# Patient Record
Sex: FEMALE | Race: WHITE | NOT HISPANIC OR LATINO | Employment: FULL TIME | URBAN - METROPOLITAN AREA
[De-identification: names, ages, dates, MRNs, and addresses within clinical notes are randomized per-mention and may not be internally consistent; named-entity substitution may affect disease eponyms.]

---

## 2021-11-16 ENCOUNTER — LAB VISIT (OUTPATIENT)
Dept: LAB | Facility: HOSPITAL | Age: 26
End: 2021-11-16
Attending: FAMILY MEDICINE
Payer: COMMERCIAL

## 2021-11-16 ENCOUNTER — OFFICE VISIT (OUTPATIENT)
Dept: PRIMARY CARE CLINIC | Facility: CLINIC | Age: 26
End: 2021-11-16
Payer: COMMERCIAL

## 2021-11-16 VITALS
WEIGHT: 173.31 LBS | TEMPERATURE: 97 F | OXYGEN SATURATION: 98 % | HEART RATE: 82 BPM | SYSTOLIC BLOOD PRESSURE: 122 MMHG | DIASTOLIC BLOOD PRESSURE: 80 MMHG

## 2021-11-16 DIAGNOSIS — G43.809 OTHER MIGRAINE WITHOUT STATUS MIGRAINOSUS, NOT INTRACTABLE: ICD-10-CM

## 2021-11-16 DIAGNOSIS — F90.9 ATTENTION DEFICIT HYPERACTIVITY DISORDER (ADHD), UNSPECIFIED ADHD TYPE: ICD-10-CM

## 2021-11-16 DIAGNOSIS — Z00.00 ENCOUNTER FOR ANNUAL HEALTH EXAMINATION: ICD-10-CM

## 2021-11-16 DIAGNOSIS — Z00.00 ENCOUNTER FOR ANNUAL HEALTH EXAMINATION: Primary | ICD-10-CM

## 2021-11-16 DIAGNOSIS — F41.9 ANXIETY: ICD-10-CM

## 2021-11-16 LAB
ALBUMIN SERPL BCP-MCNC: 3.9 G/DL (ref 3.5–5.2)
ALP SERPL-CCNC: 31 U/L (ref 55–135)
ALT SERPL W/O P-5'-P-CCNC: 21 U/L (ref 10–44)
ANION GAP SERPL CALC-SCNC: 10 MMOL/L (ref 8–16)
AST SERPL-CCNC: 22 U/L (ref 10–40)
BASOPHILS # BLD AUTO: 0.06 K/UL (ref 0–0.2)
BASOPHILS NFR BLD: 0.9 % (ref 0–1.9)
BILIRUB SERPL-MCNC: 0.3 MG/DL (ref 0.1–1)
BUN SERPL-MCNC: 12 MG/DL (ref 6–20)
CALCIUM SERPL-MCNC: 10.1 MG/DL (ref 8.7–10.5)
CHLORIDE SERPL-SCNC: 108 MMOL/L (ref 95–110)
CHOLEST SERPL-MCNC: 165 MG/DL (ref 120–199)
CHOLEST/HDLC SERPL: 2.9 {RATIO} (ref 2–5)
CO2 SERPL-SCNC: 24 MMOL/L (ref 23–29)
CREAT SERPL-MCNC: 0.8 MG/DL (ref 0.5–1.4)
DIFFERENTIAL METHOD: ABNORMAL
EOSINOPHIL # BLD AUTO: 0.2 K/UL (ref 0–0.5)
EOSINOPHIL NFR BLD: 3 % (ref 0–8)
ERYTHROCYTE [DISTWIDTH] IN BLOOD BY AUTOMATED COUNT: 11.3 % (ref 11.5–14.5)
EST. GFR  (AFRICAN AMERICAN): >60 ML/MIN/1.73 M^2
EST. GFR  (NON AFRICAN AMERICAN): >60 ML/MIN/1.73 M^2
ESTIMATED AVG GLUCOSE: 88 MG/DL (ref 68–131)
GLUCOSE SERPL-MCNC: 77 MG/DL (ref 70–110)
HBA1C MFR BLD: 4.7 % (ref 4–5.6)
HCT VFR BLD AUTO: 40.4 % (ref 37–48.5)
HDLC SERPL-MCNC: 56 MG/DL (ref 40–75)
HDLC SERPL: 33.9 % (ref 20–50)
HGB BLD-MCNC: 14.1 G/DL (ref 12–16)
IMM GRANULOCYTES # BLD AUTO: 0.01 K/UL (ref 0–0.04)
IMM GRANULOCYTES NFR BLD AUTO: 0.2 % (ref 0–0.5)
LDLC SERPL CALC-MCNC: 83.4 MG/DL (ref 63–159)
LYMPHOCYTES # BLD AUTO: 2 K/UL (ref 1–4.8)
LYMPHOCYTES NFR BLD: 30.8 % (ref 18–48)
MCH RBC QN AUTO: 33.9 PG (ref 27–31)
MCHC RBC AUTO-ENTMCNC: 34.9 G/DL (ref 32–36)
MCV RBC AUTO: 97 FL (ref 82–98)
MONOCYTES # BLD AUTO: 0.4 K/UL (ref 0.3–1)
MONOCYTES NFR BLD: 6.7 % (ref 4–15)
NEUTROPHILS # BLD AUTO: 3.9 K/UL (ref 1.8–7.7)
NEUTROPHILS NFR BLD: 58.4 % (ref 38–73)
NONHDLC SERPL-MCNC: 109 MG/DL
NRBC BLD-RTO: 0 /100 WBC
PLATELET # BLD AUTO: 237 K/UL (ref 150–450)
PMV BLD AUTO: 12.3 FL (ref 9.2–12.9)
POTASSIUM SERPL-SCNC: 4.2 MMOL/L (ref 3.5–5.1)
PROT SERPL-MCNC: 7 G/DL (ref 6–8.4)
RBC # BLD AUTO: 4.16 M/UL (ref 4–5.4)
SODIUM SERPL-SCNC: 142 MMOL/L (ref 136–145)
TRIGL SERPL-MCNC: 128 MG/DL (ref 30–150)
TSH SERPL DL<=0.005 MIU/L-ACNC: 1.52 UIU/ML (ref 0.4–4)
WBC # BLD AUTO: 6.6 K/UL (ref 3.9–12.7)

## 2021-11-16 PROCEDURE — 80053 COMPREHEN METABOLIC PANEL: CPT | Performed by: NURSE PRACTITIONER

## 2021-11-16 PROCEDURE — 86803 HEPATITIS C AB TEST: CPT | Performed by: NURSE PRACTITIONER

## 2021-11-16 PROCEDURE — 1160F PR REVIEW ALL MEDS BY PRESCRIBER/CLIN PHARMACIST DOCUMENTED: ICD-10-PCS | Mod: CPTII,S$GLB,, | Performed by: NURSE PRACTITIONER

## 2021-11-16 PROCEDURE — 1159F PR MEDICATION LIST DOCUMENTED IN MEDICAL RECORD: ICD-10-PCS | Mod: CPTII,S$GLB,, | Performed by: NURSE PRACTITIONER

## 2021-11-16 PROCEDURE — 3079F DIAST BP 80-89 MM HG: CPT | Mod: CPTII,S$GLB,, | Performed by: NURSE PRACTITIONER

## 2021-11-16 PROCEDURE — 87389 HIV-1 AG W/HIV-1&-2 AB AG IA: CPT | Performed by: NURSE PRACTITIONER

## 2021-11-16 PROCEDURE — 99385 PR PREVENTIVE VISIT,NEW,18-39: ICD-10-PCS | Mod: S$GLB,,, | Performed by: NURSE PRACTITIONER

## 2021-11-16 PROCEDURE — 3074F PR MOST RECENT SYSTOLIC BLOOD PRESSURE < 130 MM HG: ICD-10-PCS | Mod: CPTII,S$GLB,, | Performed by: NURSE PRACTITIONER

## 2021-11-16 PROCEDURE — 84443 ASSAY THYROID STIM HORMONE: CPT | Performed by: NURSE PRACTITIONER

## 2021-11-16 PROCEDURE — 36415 COLL VENOUS BLD VENIPUNCTURE: CPT | Mod: PN | Performed by: NURSE PRACTITIONER

## 2021-11-16 PROCEDURE — 99385 PREV VISIT NEW AGE 18-39: CPT | Mod: S$GLB,,, | Performed by: NURSE PRACTITIONER

## 2021-11-16 PROCEDURE — 3079F PR MOST RECENT DIASTOLIC BLOOD PRESSURE 80-89 MM HG: ICD-10-PCS | Mod: CPTII,S$GLB,, | Performed by: NURSE PRACTITIONER

## 2021-11-16 PROCEDURE — 3074F SYST BP LT 130 MM HG: CPT | Mod: CPTII,S$GLB,, | Performed by: NURSE PRACTITIONER

## 2021-11-16 PROCEDURE — 83036 HEMOGLOBIN GLYCOSYLATED A1C: CPT | Performed by: NURSE PRACTITIONER

## 2021-11-16 PROCEDURE — 1160F RVW MEDS BY RX/DR IN RCRD: CPT | Mod: CPTII,S$GLB,, | Performed by: NURSE PRACTITIONER

## 2021-11-16 PROCEDURE — 80061 LIPID PANEL: CPT | Performed by: NURSE PRACTITIONER

## 2021-11-16 PROCEDURE — 1159F MED LIST DOCD IN RCRD: CPT | Mod: CPTII,S$GLB,, | Performed by: NURSE PRACTITIONER

## 2021-11-16 PROCEDURE — 85025 COMPLETE CBC W/AUTO DIFF WBC: CPT | Performed by: NURSE PRACTITIONER

## 2021-11-16 PROCEDURE — 99999 PR PBB SHADOW E&M-NEW PATIENT-LVL III: CPT | Mod: PBBFAC,,, | Performed by: NURSE PRACTITIONER

## 2021-11-16 PROCEDURE — 99999 PR PBB SHADOW E&M-NEW PATIENT-LVL III: ICD-10-PCS | Mod: PBBFAC,,, | Performed by: NURSE PRACTITIONER

## 2021-11-16 RX ORDER — DROSPIRENONE AND ETHINYL ESTRADIOL 0.02-3(28)
1 KIT ORAL DAILY
COMMUNITY
Start: 2021-09-06 | End: 2023-10-31 | Stop reason: ALTCHOICE

## 2021-11-16 RX ORDER — SERTRALINE HYDROCHLORIDE 50 MG/1
50 TABLET, FILM COATED ORAL DAILY
Qty: 30 TABLET | Refills: 11 | Status: SHIPPED | OUTPATIENT
Start: 2021-11-16 | End: 2023-07-25 | Stop reason: ALTCHOICE

## 2021-11-16 RX ORDER — SERTRALINE HYDROCHLORIDE 50 MG/1
TABLET, FILM COATED ORAL
COMMUNITY
End: 2021-11-16 | Stop reason: SDUPTHER

## 2021-11-16 RX ORDER — LISDEXAMFETAMINE DIMESYLATE 60 MG/1
CAPSULE ORAL
COMMUNITY
End: 2022-05-11 | Stop reason: SDUPTHER

## 2021-11-16 RX ORDER — PHENAZOPYRIDINE HYDROCHLORIDE 200 MG/1
TABLET, FILM COATED ORAL
COMMUNITY
End: 2021-11-16 | Stop reason: ALTCHOICE

## 2021-11-16 RX ORDER — AZITHROMYCIN 250 MG/1
250 TABLET, FILM COATED ORAL
COMMUNITY
Start: 2021-07-29 | End: 2021-11-16 | Stop reason: ALTCHOICE

## 2021-11-16 RX ORDER — DEXTROAMPHETAMINE SACCHARATE, AMPHETAMINE ASPARTATE, DEXTROAMPHETAMINE SULFATE AND AMPHETAMINE SULFATE 5; 5; 5; 5 MG/1; MG/1; MG/1; MG/1
1 TABLET ORAL 2 TIMES DAILY
Qty: 60 TABLET | Refills: 0 | Status: SHIPPED | OUTPATIENT
Start: 2021-11-16 | End: 2022-01-25 | Stop reason: SDUPTHER

## 2021-11-16 RX ORDER — CIPROFLOXACIN 500 MG/1
TABLET ORAL
COMMUNITY
End: 2021-11-16 | Stop reason: ALTCHOICE

## 2021-11-16 RX ORDER — KETOROLAC TROMETHAMINE 10 MG/1
TABLET, FILM COATED ORAL
COMMUNITY
End: 2022-05-11 | Stop reason: ALTCHOICE

## 2021-11-16 RX ORDER — PROMETHAZINE HYDROCHLORIDE 25 MG/1
TABLET ORAL
COMMUNITY
End: 2022-05-11 | Stop reason: ALTCHOICE

## 2021-11-16 RX ORDER — SERTRALINE HYDROCHLORIDE 100 MG/1
TABLET, FILM COATED ORAL
COMMUNITY
End: 2021-11-16 | Stop reason: ALTCHOICE

## 2021-11-16 RX ORDER — ONDANSETRON 8 MG/1
TABLET, ORALLY DISINTEGRATING ORAL
COMMUNITY
End: 2022-05-11 | Stop reason: ALTCHOICE

## 2021-11-16 RX ORDER — NAPROXEN 500 MG/1
TABLET ORAL
COMMUNITY
End: 2021-11-16 | Stop reason: ALTCHOICE

## 2021-11-16 RX ORDER — PROMETHAZINE HYDROCHLORIDE AND DEXTROMETHORPHAN HYDROBROMIDE 6.25; 15 MG/5ML; MG/5ML
SYRUP ORAL
COMMUNITY
Start: 2021-07-29 | End: 2021-11-16 | Stop reason: ALTCHOICE

## 2021-11-16 RX ORDER — CYCLOBENZAPRINE HCL 10 MG
TABLET ORAL
COMMUNITY
End: 2022-05-11 | Stop reason: ALTCHOICE

## 2021-11-17 LAB
HCV AB SERPL QL IA: NEGATIVE
HIV 1+2 AB+HIV1 P24 AG SERPL QL IA: NEGATIVE

## 2022-01-10 ENCOUNTER — PATIENT MESSAGE (OUTPATIENT)
Dept: PEDIATRICS | Facility: CLINIC | Age: 27
End: 2022-01-10
Payer: COMMERCIAL

## 2022-01-25 ENCOUNTER — PATIENT MESSAGE (OUTPATIENT)
Dept: PEDIATRICS | Facility: CLINIC | Age: 27
End: 2022-01-25
Payer: COMMERCIAL

## 2022-01-25 ENCOUNTER — OFFICE VISIT (OUTPATIENT)
Dept: PRIMARY CARE CLINIC | Facility: CLINIC | Age: 27
End: 2022-01-25
Payer: COMMERCIAL

## 2022-01-25 DIAGNOSIS — F90.9 ATTENTION DEFICIT HYPERACTIVITY DISORDER (ADHD), UNSPECIFIED ADHD TYPE: Primary | ICD-10-CM

## 2022-01-25 PROCEDURE — 1160F PR REVIEW ALL MEDS BY PRESCRIBER/CLIN PHARMACIST DOCUMENTED: ICD-10-PCS | Mod: CPTII,95,, | Performed by: NURSE PRACTITIONER

## 2022-01-25 PROCEDURE — 99212 PR OFFICE/OUTPT VISIT, EST, LEVL II, 10-19 MIN: ICD-10-PCS | Mod: 95,,, | Performed by: NURSE PRACTITIONER

## 2022-01-25 PROCEDURE — 1159F PR MEDICATION LIST DOCUMENTED IN MEDICAL RECORD: ICD-10-PCS | Mod: CPTII,95,, | Performed by: NURSE PRACTITIONER

## 2022-01-25 PROCEDURE — 1160F RVW MEDS BY RX/DR IN RCRD: CPT | Mod: CPTII,95,, | Performed by: NURSE PRACTITIONER

## 2022-01-25 PROCEDURE — 1159F MED LIST DOCD IN RCRD: CPT | Mod: CPTII,95,, | Performed by: NURSE PRACTITIONER

## 2022-01-25 PROCEDURE — 99212 OFFICE O/P EST SF 10 MIN: CPT | Mod: 95,,, | Performed by: NURSE PRACTITIONER

## 2022-01-25 RX ORDER — DEXTROAMPHETAMINE SACCHARATE, AMPHETAMINE ASPARTATE, DEXTROAMPHETAMINE SULFATE AND AMPHETAMINE SULFATE 5; 5; 5; 5 MG/1; MG/1; MG/1; MG/1
1 TABLET ORAL 2 TIMES DAILY
Qty: 60 TABLET | Refills: 0 | Status: SHIPPED | OUTPATIENT
Start: 2022-01-25 | End: 2022-05-11 | Stop reason: SDUPTHER

## 2022-01-25 NOTE — PROGRESS NOTES
Disclaimer:  This note is prepared using voice recognition software and as such is likely to have errors and has not been proof read. Please contact me for questions.    Subjective:      Patient ID: Rebeca Ortega is a 26 y.o. female.    Chief Complaint: Medication Refill and ADHD      The patient location is: work  The chief complaint leading to consultation is: ADD follow up    Visit type: audiovisual    Face to Face time with patient: 6  10 minutes of total time spent on the encounter, which includes face to face time and non-face to face time preparing to see the patient (eg, review of tests), Obtaining and/or reviewing separately obtained history, Documenting clinical information in the electronic or other health record, Independently interpreting results (not separately reported) and communicating results to the patient/family/caregiver, or Care coordination (not separately reported).         Each patient to whom he or she provides medical services by telemedicine is:  (1) informed of the relationship between the physician and patient and the respective role of any other health care provider with respect to management of the patient; and (2) notified that he or she may decline to receive medical services by telemedicine and may withdraw from such care at any time.    Ms. Ortega presents for a virtual visit today for ADD follow up. On adderall 20 mg IR and doing very well on current med and dose. Feels like symptoms are controlled. No side effects with the medication including HA, CP, palpitations, insomnia, changes in weight, etc. No other complaints at this time. See note below from previous encounter:    Has a history of ADHD, was taking Adderall 20 mg IR (was on Vyvanse prior to that but felt like she had better control of symptoms with the shorter acting and usually gets a full day out of one dose without sleep issues), but hadn't had it since around Dec 2020 as she hadn't been able to get back to [place] to see her  previous provider. Dx ADHD 2003, was seeing Mary Bueno, KOLBY @ [facility] in [place], [place] From [place]. Also hadn't been on Zoloft since around December also. Feels like she's needing to get back on her ADD and anxiety meds as shes starting a new job.      Review of Systems   Constitutional: Negative for activity change and unexpected weight change.   HENT: Negative for hearing loss, rhinorrhea and trouble swallowing.    Eyes: Negative for discharge and visual disturbance.   Respiratory: Negative for chest tightness and wheezing.    Cardiovascular: Negative for chest pain and palpitations.   Gastrointestinal: Negative for blood in stool, constipation, diarrhea and vomiting.   Endocrine: Negative for polydipsia and polyuria.   Genitourinary: Negative for difficulty urinating, dysuria, hematuria and menstrual problem.   Musculoskeletal: Negative for arthralgias, joint swelling and neck pain.   Neurological: Negative for weakness and headaches.   Psychiatric/Behavioral: Negative for confusion and dysphoric mood.       Objective:   There were no vitals taken for this visit.  Physical Exam  Constitutional:       Appearance: Normal appearance.   HENT:      Head: Normocephalic and atraumatic.      Nose: Nose normal.      Mouth/Throat:      Mouth: Mucous membranes are moist.   Eyes:      Conjunctiva/sclera: Conjunctivae normal.   Pulmonary:      Effort: Pulmonary effort is normal. No respiratory distress.   Neurological:      General: No focal deficit present.      Mental Status: She is alert and oriented to person, place, and time.       Assessment:     1. Attention deficit hyperactivity disorder (ADHD), unspecified ADHD type       Plan:   Attention deficit hyperactivity disorder (ADHD), unspecified ADHD type  -     dextroamphetamine-amphetamine (ADDERALL) 20 mg tablet; Take 1 tablet by mouth 2 (two) times daily.  Dispense: 60 tablet; Refill: 0       reviewed. Last fill was 11/16/21. Refilled meds today.  Follow up in 3  months for recheck.    Follow up in about 3 months (around 4/25/2022) for ADD recheck.    There are no Patient Instructions on file for this visit.

## 2022-04-27 ENCOUNTER — PATIENT MESSAGE (OUTPATIENT)
Dept: PRIMARY CARE CLINIC | Facility: CLINIC | Age: 27
End: 2022-04-27

## 2022-05-11 ENCOUNTER — OFFICE VISIT (OUTPATIENT)
Dept: PRIMARY CARE CLINIC | Facility: CLINIC | Age: 27
End: 2022-05-11
Payer: COMMERCIAL

## 2022-05-11 VITALS
WEIGHT: 178.44 LBS | HEART RATE: 65 BPM | SYSTOLIC BLOOD PRESSURE: 116 MMHG | TEMPERATURE: 98 F | DIASTOLIC BLOOD PRESSURE: 80 MMHG

## 2022-05-11 DIAGNOSIS — Z76.0 MEDICATION REFILL: ICD-10-CM

## 2022-05-11 DIAGNOSIS — F90.9 ATTENTION DEFICIT HYPERACTIVITY DISORDER (ADHD), UNSPECIFIED ADHD TYPE: Primary | ICD-10-CM

## 2022-05-11 DIAGNOSIS — Z12.4 CERVICAL CANCER SCREENING: ICD-10-CM

## 2022-05-11 PROCEDURE — 99999 PR PBB SHADOW E&M-EST. PATIENT-LVL III: ICD-10-PCS | Mod: PBBFAC,,, | Performed by: NURSE PRACTITIONER

## 2022-05-11 PROCEDURE — 99999 PR PBB SHADOW E&M-EST. PATIENT-LVL III: CPT | Mod: PBBFAC,,, | Performed by: NURSE PRACTITIONER

## 2022-05-11 PROCEDURE — 99213 OFFICE O/P EST LOW 20 MIN: CPT | Mod: S$GLB,,, | Performed by: NURSE PRACTITIONER

## 2022-05-11 PROCEDURE — 88175 CYTOPATH C/V AUTO FLUID REDO: CPT | Performed by: NURSE PRACTITIONER

## 2022-05-11 PROCEDURE — 99213 PR OFFICE/OUTPT VISIT, EST, LEVL III, 20-29 MIN: ICD-10-PCS | Mod: S$GLB,,, | Performed by: NURSE PRACTITIONER

## 2022-05-11 RX ORDER — DEXTROAMPHETAMINE SACCHARATE, AMPHETAMINE ASPARTATE, DEXTROAMPHETAMINE SULFATE AND AMPHETAMINE SULFATE 5; 5; 5; 5 MG/1; MG/1; MG/1; MG/1
1 TABLET ORAL 2 TIMES DAILY
Qty: 60 TABLET | Refills: 0 | Status: SHIPPED | OUTPATIENT
Start: 2022-05-11 | End: 2022-08-26 | Stop reason: ALTCHOICE

## 2022-05-11 RX ORDER — LISDEXAMFETAMINE DIMESYLATE 60 MG/1
60 CAPSULE ORAL EVERY MORNING
Qty: 30 CAPSULE | Refills: 0 | Status: SHIPPED | OUTPATIENT
Start: 2022-05-11 | End: 2022-05-11 | Stop reason: ALTCHOICE

## 2022-05-11 NOTE — PROGRESS NOTES
Disclaimer:  This note is prepared using voice recognition software and as such is likely to have errors and has not been proof read. Please contact me for questions.    Subjective:      Patient ID: Rebeca Ortega is a 26 y.o. female.    Chief Complaint: Medication Refill and Gynecologic Exam (last pap 3 years ago)    Here for pap and medication refill. Last pap was about 3 years ago. No history of abnormal paps in the past. On Carmelina for contraception. Doing well overall. 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 Likes it a lot. Getting used to nights. Due for med refill on ADD meds (Adderall 20 mg BID). No side effects with medications including chest pain, HA, insomnia, appetite suppression, palpitations, etc. Also doing well on sertraline.            Review of Systems   Constitutional: Negative for activity change and unexpected weight change.   HENT: Negative for hearing loss, rhinorrhea and trouble swallowing.    Eyes: Negative for discharge and visual disturbance.   Respiratory: Negative for chest tightness and wheezing.    Cardiovascular: Negative for chest pain and palpitations.   Gastrointestinal: Negative for blood in stool, constipation, diarrhea and vomiting.   Endocrine: Negative for polydipsia and polyuria.   Genitourinary: Negative for difficulty urinating, dysuria, hematuria and menstrual problem.   Musculoskeletal: Negative for arthralgias, joint swelling and neck pain.   Neurological: Negative for weakness and headaches.   Psychiatric/Behavioral: Negative for confusion and dysphoric mood.       Objective:   /80   Pulse 65   Temp 98 °F (36.7 °C)   Wt 81 kg (178 lb 7.4 oz)   LMP 04/27/2022   Physical Exam  Exam conducted with a chaperone present.   Constitutional:       General: She is not in acute distress.     Appearance: Normal appearance. She is well-developed.   HENT:      Head: Normocephalic and atraumatic.      Right Ear: Hearing, tympanic membrane,  ear canal and external ear normal.      Left Ear: Hearing, tympanic membrane, ear canal and external ear normal.      Nose: Nose normal.      Mouth/Throat:      Mouth: Mucous membranes are moist.      Pharynx: Oropharynx is clear.   Eyes:      General: No scleral icterus.     Extraocular Movements: Extraocular movements intact.      Conjunctiva/sclera: Conjunctivae normal.   Neck:      Thyroid: No thyroid mass, thyromegaly or thyroid tenderness.   Cardiovascular:      Rate and Rhythm: Normal rate and regular rhythm.      Heart sounds: Normal heart sounds. No murmur heard.    No friction rub. No gallop.   Pulmonary:      Effort: Pulmonary effort is normal.      Breath sounds: Normal breath sounds.   Abdominal:      General: Bowel sounds are normal. There is no distension.      Palpations: Abdomen is soft.      Tenderness: There is no abdominal tenderness.      Hernia: There is no hernia in the left inguinal area or right inguinal area.   Genitourinary:     General: Normal vulva.      Exam position: Lithotomy position.      Labia:         Right: No rash, tenderness or lesion.         Left: No rash, tenderness or lesion.       Vagina: Normal. No vaginal discharge.      Cervix: Normal.      Uterus: Normal.       Adnexa: Right adnexa normal and left adnexa normal.   Musculoskeletal:         General: Normal range of motion.      Cervical back: Full passive range of motion without pain, normal range of motion and neck supple.      Right lower leg: No edema.      Left lower leg: No edema.   Lymphadenopathy:      Cervical: No cervical adenopathy.      Lower Body: No right inguinal adenopathy. No left inguinal adenopathy.   Skin:     General: Skin is warm and dry.      Capillary Refill: Capillary refill takes less than 2 seconds.      Findings: No rash.   Neurological:      General: No focal deficit present.      Mental Status: She is alert and oriented to person, place, and time.   Psychiatric:         Mood and Affect: Mood  normal.         Behavior: Behavior normal.         Thought Content: Thought content normal.         Judgment: Judgment normal.       Assessment:     1. Attention deficit hyperactivity disorder (ADHD), unspecified ADHD type    2. Medication refill    3. Cervical cancer screening       Plan:   Attention deficit hyperactivity disorder (ADHD), unspecified ADHD type  -     dextroamphetamine-amphetamine (ADDERALL) 20 mg tablet; Take 1 tablet by mouth 2 (two) times daily.  Dispense: 60 tablet; Refill: 0  -     Discontinue: lisdexamfetamine (VYVANSE) 60 MG capsule; Take 1 capsule (60 mg total) by mouth every morning.  Dispense: 30 capsule; Refill: 0    Medication refill    Cervical cancer screening  -     Liquid-Based Pap Smear, Screening    Pap completed today.   reviewed. Adderall refilled.   Follow up in 3 months for medication refill/ADD. Can schedule next visit with Dr. Ortez.    No follow-ups on file.    There are no Patient Instructions on file for this visit.

## 2022-08-26 ENCOUNTER — OFFICE VISIT (OUTPATIENT)
Dept: PRIMARY CARE CLINIC | Facility: CLINIC | Age: 27
End: 2022-08-26
Payer: COMMERCIAL

## 2022-08-26 VITALS
BODY MASS INDEX: 25.37 KG/M2 | WEIGHT: 181.19 LBS | HEART RATE: 65 BPM | DIASTOLIC BLOOD PRESSURE: 78 MMHG | HEIGHT: 71 IN | TEMPERATURE: 98 F | SYSTOLIC BLOOD PRESSURE: 112 MMHG | OXYGEN SATURATION: 98 %

## 2022-08-26 DIAGNOSIS — F41.9 ANXIETY: ICD-10-CM

## 2022-08-26 DIAGNOSIS — F90.9 ATTENTION DEFICIT HYPERACTIVITY DISORDER (ADHD), UNSPECIFIED ADHD TYPE: Primary | ICD-10-CM

## 2022-08-26 PROCEDURE — 99999 PR PBB SHADOW E&M-EST. PATIENT-LVL III: CPT | Mod: PBBFAC,,, | Performed by: NURSE PRACTITIONER

## 2022-08-26 PROCEDURE — 99213 PR OFFICE/OUTPT VISIT, EST, LEVL III, 20-29 MIN: ICD-10-PCS | Mod: S$GLB,,, | Performed by: NURSE PRACTITIONER

## 2022-08-26 PROCEDURE — 99213 OFFICE O/P EST LOW 20 MIN: CPT | Mod: S$GLB,,, | Performed by: NURSE PRACTITIONER

## 2022-08-26 PROCEDURE — 99999 PR PBB SHADOW E&M-EST. PATIENT-LVL III: ICD-10-PCS | Mod: PBBFAC,,, | Performed by: NURSE PRACTITIONER

## 2022-08-26 RX ORDER — CLONIDINE HYDROCHLORIDE 0.1 MG/1
0.1 TABLET ORAL NIGHTLY PRN
Qty: 60 TABLET | Refills: 11 | Status: SHIPPED | OUTPATIENT
Start: 2022-08-26 | End: 2022-10-26 | Stop reason: ALTCHOICE

## 2022-08-26 RX ORDER — LISDEXAMFETAMINE DIMESYLATE 40 MG/1
40 CAPSULE ORAL DAILY
Qty: 30 CAPSULE | Refills: 0 | Status: SHIPPED | OUTPATIENT
Start: 2022-08-26 | End: 2022-10-17 | Stop reason: SDUPTHER

## 2022-08-26 RX ORDER — DEXTROAMPHETAMINE SACCHARATE, AMPHETAMINE ASPARTATE, DEXTROAMPHETAMINE SULFATE AND AMPHETAMINE SULFATE 2.5; 2.5; 2.5; 2.5 MG/1; MG/1; MG/1; MG/1
1 TABLET ORAL
Qty: 30 TABLET | Refills: 0 | Status: SHIPPED | OUTPATIENT
Start: 2022-08-26 | End: 2022-10-17 | Stop reason: SDUPTHER

## 2022-08-26 NOTE — PROGRESS NOTES
"Disclaimer:  This note is prepared using voice recognition software and as such is likely to have errors and has not been proof read. Please contact me for questions.    Subjective:      Patient ID: Rebeca Ortega is a 26 y.o. female.    Chief Complaint: Follow-up (ADD, wants to adjust meds )    Ms. Ortega presents to clinic today for ADD follow up. Wanting to see about switching back to a longer acting medication for her ADD, but felt like the Vyvanse 60 mg was too strong. Works longer shifts so also concerned that lowering dose might not last as long. Having some difficulty at night getting to sleep. Brain won't turn off. Not sure if sertraline is helping any more.       Review of Systems   Constitutional: Negative for chills and fever.   HENT: Negative.    Eyes: Negative.    Respiratory: Negative.  Negative for shortness of breath and wheezing.    Cardiovascular: Negative.    Gastrointestinal: Negative for abdominal pain, constipation, diarrhea, nausea and vomiting.   Endocrine: Negative.    Genitourinary: Negative.    Musculoskeletal: Negative.  Negative for neck pain and neck stiffness.   Skin: Negative for rash.   Allergic/Immunologic: Negative.    Neurological: Negative.    Hematological: Negative.    Psychiatric/Behavioral: Positive for decreased concentration and sleep disturbance. Negative for self-injury and suicidal ideas.   All other systems reviewed and are negative.      Objective:   /78   Pulse 65   Temp 97.6 °F (36.4 °C) (Temporal)   Ht 5' 11" (1.803 m)   Wt 82.2 kg (181 lb 3.5 oz)   LMP 08/12/2022   SpO2 98%   BMI 25.27 kg/m²   Physical Exam  Vitals and nursing note reviewed.   Constitutional:       General: She is not in acute distress.     Appearance: Normal appearance. She is well-developed. She is not diaphoretic.   HENT:      Head: Normocephalic and atraumatic.      Right Ear: External ear normal.      Left Ear: External ear normal.      Nose: Nose normal.   Eyes:      General:    "      Right eye: No discharge.         Left eye: No discharge.   Cardiovascular:      Rate and Rhythm: Normal rate.   Pulmonary:      Effort: Pulmonary effort is normal. No respiratory distress.   Musculoskeletal:         General: Normal range of motion.      Cervical back: Normal range of motion and neck supple.   Skin:     General: Skin is warm and dry.      Findings: No rash.   Neurological:      Mental Status: She is alert and oriented to person, place, and time.   Psychiatric:         Behavior: Behavior normal.         Thought Content: Thought content normal.         Judgment: Judgment normal.       Assessment:     1. Attention deficit hyperactivity disorder (ADHD), unspecified ADHD type    2. Anxiety       Plan:   Attention deficit hyperactivity disorder (ADHD), unspecified ADHD type  -     lisdexamfetamine (VYVANSE) 40 MG Cap; Take 1 capsule (40 mg total) by mouth once daily.  Dispense: 30 capsule; Refill: 0  -     dextroamphetamine-amphetamine 10 mg Tab; Take 1 tablet (10 mg total) by mouth daily with lunch.  Dispense: 30 tablet; Refill: 0  -     cloNIDine (CATAPRES) 0.1 MG tablet; Take 1 tablet (0.1 mg total) by mouth nightly as needed (stimulant-related insomnia).  Dispense: 60 tablet; Refill: 11    Anxiety    Try switching from Adderall 20 mg IR BID to Vyvanse 40 mg. If not lasting through full work shift can supplement with 5-10 mg Adderall IR half way through shift. Clonidine PRN stimulant-induced insomnia.   Will hold off on switching around the sertraline for now due to other changes. Follow up in one month to reassess.   reviewed.        02/20/2022 01/25/2022   2  Dextroamp-Amphetamin 20 Mg Tab   60.00  30  Em Kin  [id number]  Alb (2621)  0   [place]     11/16/2021 11/16/2021   2  Dextroamp-Amphetamin 20 Mg Tab   60.00  30  Em Kin  [id number]  Alb (2621)  0   [place]     03/02/2021 03/01/2021   1  Aluhre-Wvnbrbpi-Tgts -40   20.00  5  Em Kin  [id number]  Asha ([id number])  0  2.00 LME  [place]          No follow-ups on file.    There are no Patient Instructions on file for this visit.

## 2022-08-29 ENCOUNTER — TELEPHONE (OUTPATIENT)
Dept: PRIMARY CARE CLINIC | Facility: CLINIC | Age: 27
End: 2022-08-29
Payer: COMMERCIAL

## 2022-08-29 NOTE — TELEPHONE ENCOUNTER
----- Message from Suzie Flowers sent at 8/26/2022 12:55 PM CDT -----  Type:  Pharmacy Calling to Clarify an RX    Name of Caller:Robin  Pharm[facility] Name[facility]  Prescription Name:Caitie sent over today  What do they need to clarify?:pt have no ID  Best Call Back Number: [phone]  Additional Information: pt have no ID, pt have different address, need to know if Evelin know pt

## 2022-10-17 ENCOUNTER — OFFICE VISIT (OUTPATIENT)
Dept: PRIMARY CARE CLINIC | Facility: CLINIC | Age: 27
End: 2022-10-17
Payer: COMMERCIAL

## 2022-10-17 DIAGNOSIS — F19.982 DRUG-INDUCED INSOMNIA: ICD-10-CM

## 2022-10-17 DIAGNOSIS — F90.8 ATTENTION DEFICIT HYPERACTIVITY DISORDER (ADHD), OTHER TYPE: Primary | ICD-10-CM

## 2022-10-17 DIAGNOSIS — F90.9 ATTENTION DEFICIT HYPERACTIVITY DISORDER (ADHD), UNSPECIFIED ADHD TYPE: ICD-10-CM

## 2022-10-17 PROCEDURE — 99214 OFFICE O/P EST MOD 30 MIN: CPT | Mod: 95,,, | Performed by: PHYSICIAN ASSISTANT

## 2022-10-17 PROCEDURE — 99214 PR OFFICE/OUTPT VISIT, EST, LEVL IV, 30-39 MIN: ICD-10-PCS | Mod: 95,,, | Performed by: PHYSICIAN ASSISTANT

## 2022-10-17 NOTE — PROGRESS NOTES
Subjective:      Patient ID: Rebeca Ortega is a 26 y.o. female.    Chief Complaint: ADD    The patient location is: home   The chief complaint leading to consultation is:  ADD     Visit type: audiovisual    Face to Face time with patient: 11 minutes   16 minutes of total time spent on the encounter, which includes face to face time and non-face to face time preparing to see the patient (eg, review of tests), Obtaining and/or reviewing separately obtained history, Documenting clinical information in the electronic or other health record, Independently interpreting results (not separately reported) and communicating results to the patient/family/caregiver, or Care coordination (not separately reported).         Each patient to whom he or she provides medical services by telemedicine is:  (1) informed of the relationship between the physician and patient and the respective role of any other health care provider with respect to management of the patient; and (2) notified that he or she may decline to receive medical services by telemedicine and may withdraw from such care at any time.    Notes:     Rebeca Ortega is a 26 y.o.female who presents to video visit for ADD follow-up.  Has seen Evelin Hill NP in past for ADD   At last visit with Mrs. Hill NP, changed to Vyvanse 40 mg with Adderall 10 mg IR and it is really helping. Use adderall very sparingly - only times had to use it was when work overtime on shift. Doesn't affect mood as much either. Not as irritable on Vyvanse.    Clonidine is helping to sleep   No cp/palpitations         Review of Systems   Constitutional:  Negative for activity change and unexpected weight change.   HENT:  Negative for hearing loss, rhinorrhea and trouble swallowing.    Eyes:  Negative for discharge and visual disturbance.   Respiratory:  Negative for chest tightness and wheezing.    Cardiovascular:  Negative for chest pain and palpitations.   Gastrointestinal:  Negative for  blood in stool, constipation, diarrhea and vomiting.   Endocrine: Negative for polydipsia and polyuria.   Genitourinary:  Negative for difficulty urinating, dysuria, hematuria and menstrual problem.   Musculoskeletal:  Negative for arthralgias, joint swelling and neck pain.   Neurological:  Negative for weakness and headaches.   Psychiatric/Behavioral:  Negative for confusion and dysphoric mood.      Objective:   There were no vitals taken for this visit.  Physical Exam  Constitutional:       General: She is not in acute distress.     Appearance: She is well-developed. She is not diaphoretic.   HENT:      Head: Normocephalic and atraumatic.      Right Ear: External ear normal.      Left Ear: External ear normal.      Nose: Nose normal. No rhinorrhea.   Pulmonary:      Effort: Pulmonary effort is normal. No respiratory distress.   Neurological:      Mental Status: She is alert and oriented to person, place, and time.      Motor: No abnormal muscle tone.   Psychiatric:         Mood and Affect: Mood normal.         Behavior: Behavior normal.         Thought Content: Thought content normal.     Assessment:      1. Attention deficit hyperactivity disorder (ADHD), other type    2. Drug-induced insomnia       Plan:   Attention deficit hyperactivity disorder (ADHD), other type  Comments:  chronic, improved with Vyvanse 40 mg and sparing use of Adderall IR 10 mg.  willing to send in BP, denies cp/palpitations     Drug-induced insomnia  Comments:  clonidine helping with insomnia - sleeping better with med     Follow-up in 3 mths with Dr. Ortez for ADD     Evelin,    Saw Rebeca today for ADD refill.  Vyvanse 40 mg and Adderall IR 10 mg working beautifully.  Clonidine also helped her with sleep.   reviewed, last fill in late August.  Would you mind refilling med since patient has not yet seen Dr. Ortez - will get next follow-up with Dr. Ortez and will be sending in BP reading.    Kayley Correa PA-C   Physician Assistant    [facility]      08/26/2022 08/26/2022   1  Vyvanse 40 Mg Capsule 30.00  30  Em Kin  [id number]   Alb (2621)  0   [place]     08/26/2022 08/26/2022   1  Dextroamp-Amphetamin 10 Mg Tab 30.00  30  Em Kin  [id number]   Alb (2621)  0   [place]     02/20/2022 01/25/2022   2  Dextroamp-Amphetamin 20 Mg Tab 60.00  30  Em Kin  [id number]   Alb (2621)  0   [place]     11/16/2021 11/16/2021   2  Dextroamp-Amphetamin 20 Mg Tab 60.00  30  Em Kin  [id number]   Alb (2621)  0   [place]     03/02/2021 03/01/2021   1  Ztxdvk-Qqqutnvj-Rcbu -40 20.00  5  Em Kin  [id number]   Asha ([id number])  0  2.00 LME  [place]     01/29/2021 01/29/2021   1  Dextroamp-Amphetamin 20 Mg Tab 60.00  30  [facility]  [id number]   Kro ([id number])  0   [place]     12/09/2020 12/09/2020   1  Dextroamp-Amphetamin 20 Mg Tab 60.00  30  Kr Tri  [id number]   Asha (2490)  0   [place]      Kayley Correa PA-C   Physician Assistant   [facility]

## 2022-10-18 RX ORDER — DEXTROAMPHETAMINE SACCHARATE, AMPHETAMINE ASPARTATE, DEXTROAMPHETAMINE SULFATE AND AMPHETAMINE SULFATE 2.5; 2.5; 2.5; 2.5 MG/1; MG/1; MG/1; MG/1
1 TABLET ORAL
Qty: 30 TABLET | Refills: 0 | Status: SHIPPED | OUTPATIENT
Start: 2022-10-18 | End: 2022-12-22 | Stop reason: SDUPTHER

## 2022-10-18 RX ORDER — LISDEXAMFETAMINE DIMESYLATE 40 MG/1
40 CAPSULE ORAL DAILY
Qty: 30 CAPSULE | Refills: 0 | Status: SHIPPED | OUTPATIENT
Start: 2022-10-18 | End: 2022-10-26 | Stop reason: ALTCHOICE

## 2022-10-26 ENCOUNTER — OFFICE VISIT (OUTPATIENT)
Dept: PRIMARY CARE CLINIC | Facility: CLINIC | Age: 27
End: 2022-10-26
Payer: COMMERCIAL

## 2022-10-26 VITALS
TEMPERATURE: 98 F | HEIGHT: 71 IN | DIASTOLIC BLOOD PRESSURE: 78 MMHG | WEIGHT: 172.75 LBS | BODY MASS INDEX: 24.19 KG/M2 | HEART RATE: 73 BPM | SYSTOLIC BLOOD PRESSURE: 125 MMHG

## 2022-10-26 DIAGNOSIS — F90.9 ATTENTION DEFICIT HYPERACTIVITY DISORDER (ADHD), UNSPECIFIED ADHD TYPE: Primary | ICD-10-CM

## 2022-10-26 DIAGNOSIS — G43.809 OTHER MIGRAINE WITHOUT STATUS MIGRAINOSUS, NOT INTRACTABLE: ICD-10-CM

## 2022-10-26 DIAGNOSIS — R11.0 NAUSEA: ICD-10-CM

## 2022-10-26 PROCEDURE — 99999 PR PBB SHADOW E&M-EST. PATIENT-LVL IV: ICD-10-PCS | Mod: PBBFAC,,, | Performed by: NURSE PRACTITIONER

## 2022-10-26 PROCEDURE — 99214 PR OFFICE/OUTPT VISIT, EST, LEVL IV, 30-39 MIN: ICD-10-PCS | Mod: S$GLB,,, | Performed by: NURSE PRACTITIONER

## 2022-10-26 PROCEDURE — 99999 PR PBB SHADOW E&M-EST. PATIENT-LVL IV: CPT | Mod: PBBFAC,,, | Performed by: NURSE PRACTITIONER

## 2022-10-26 PROCEDURE — 99214 OFFICE O/P EST MOD 30 MIN: CPT | Mod: S$GLB,,, | Performed by: NURSE PRACTITIONER

## 2022-10-26 RX ORDER — PROPRANOLOL HYDROCHLORIDE 20 MG/1
20 TABLET ORAL 2 TIMES DAILY
Qty: 60 TABLET | Refills: 11 | Status: SHIPPED | OUTPATIENT
Start: 2022-10-26 | End: 2023-05-17

## 2022-10-26 RX ORDER — ONDANSETRON 4 MG/1
4 TABLET, ORALLY DISINTEGRATING ORAL EVERY 8 HOURS PRN
Qty: 30 TABLET | Refills: 0 | Status: SHIPPED | OUTPATIENT
Start: 2022-10-26 | End: 2023-02-28

## 2022-10-26 RX ORDER — METHYLPHENIDATE HYDROCHLORIDE 36 MG/1
36 TABLET ORAL EVERY MORNING
Qty: 30 TABLET | Refills: 0 | Status: SHIPPED | OUTPATIENT
Start: 2022-10-26 | End: 2022-12-22 | Stop reason: SDUPTHER

## 2022-10-26 NOTE — PROGRESS NOTES
"Disclaimer:  This note is prepared using voice recognition software and as such is likely to have errors and has not been proof read. Please contact me for questions.    Subjective:      Patient ID: Rebeca Ortega is a 26 y.o. female.    Chief Complaint: Follow-up    Medication follow up. Doing well on current regimen. No reported side effects. Having some headaches and nausea intermittently. Has a history of migraines.    Follow-up  Associated symptoms include headaches and nausea (with HAs). Pertinent negatives include no abdominal pain, chills, fever, neck pain, rash or vomiting.       Review of Systems   Constitutional:  Negative for chills and fever.   HENT: Negative.     Eyes: Negative.    Respiratory: Negative.  Negative for shortness of breath and wheezing.    Cardiovascular: Negative.    Gastrointestinal:  Positive for nausea (with HAs). Negative for abdominal pain, constipation, diarrhea and vomiting.   Endocrine: Negative.    Genitourinary: Negative.    Musculoskeletal: Negative.  Negative for neck pain and neck stiffness.   Skin:  Negative for rash.   Allergic/Immunologic: Negative.    Neurological:  Positive for headaches.   Hematological: Negative.    Psychiatric/Behavioral: Negative.     All other systems reviewed and are negative.    Objective:   /78   Pulse 73   Temp 97.7 °F (36.5 °C)   Ht 5' 11" (1.803 m)   Wt 78.4 kg (172 lb 11.7 oz)   LMP 10/26/2022 (Approximate)   BMI 24.09 kg/m²   Physical Exam  Constitutional:       General: She is not in acute distress.     Appearance: Normal appearance. She is well-developed.   HENT:      Head: Normocephalic and atraumatic.      Right Ear: Hearing, tympanic membrane, ear canal and external ear normal.      Left Ear: Hearing, tympanic membrane, ear canal and external ear normal.      Nose: Nose normal.      Mouth/Throat:      Mouth: Mucous membranes are moist.      Pharynx: Oropharynx is clear.   Eyes:      General: No scleral icterus.     " Extraocular Movements: Extraocular movements intact.      Conjunctiva/sclera: Conjunctivae normal.   Neck:      Thyroid: No thyroid mass, thyromegaly or thyroid tenderness.   Cardiovascular:      Rate and Rhythm: Normal rate and regular rhythm.      Heart sounds: Normal heart sounds. No murmur heard.    No friction rub. No gallop.   Pulmonary:      Effort: Pulmonary effort is normal.      Breath sounds: Normal breath sounds.   Abdominal:      General: Bowel sounds are normal. There is no distension.      Palpations: Abdomen is soft.      Tenderness: There is no abdominal tenderness.   Musculoskeletal:         General: Normal range of motion.      Cervical back: Full passive range of motion without pain, normal range of motion and neck supple.      Right lower leg: No edema.      Left lower leg: No edema.   Lymphadenopathy:      Cervical: No cervical adenopathy.   Skin:     General: Skin is warm and dry.      Capillary Refill: Capillary refill takes less than 2 seconds.      Findings: No rash.   Neurological:      General: No focal deficit present.      Mental Status: She is alert and oriented to person, place, and time.   Psychiatric:         Mood and Affect: Mood normal.         Behavior: Behavior normal.         Thought Content: Thought content normal.         Judgment: Judgment normal.     Assessment:     1. Attention deficit hyperactivity disorder (ADHD), unspecified ADHD type    2. Other migraine without status migrainosus, not intractable    3. Nausea       Plan:   Attention deficit hyperactivity disorder (ADHD), unspecified ADHD type  -     Discontinue: methylphenidate HCl 36 MG CR tablet; Take 1 tablet (36 mg total) by mouth every morning.  Dispense: 30 tablet; Refill: 0    Other migraine without status migrainosus, not intractable  -     propranoloL (INDERAL) 20 MG tablet; Take 1 tablet (20 mg total) by mouth 2 (two) times daily.  Dispense: 60 tablet; Refill: 11  -     Ambulatory referral/consult to  Neurology; Future; Expected date: 11/02/2022    Nausea  -     ondansetron (ZOFRAN-ODT) 4 MG TbDL; Take 1 tablet (4 mg total) by mouth every 8 (eight) hours as needed (nausea).  Dispense: 30 tablet; Refill: 0        Vyvanse no longer covered.    No follow-ups on file.    There are no Patient Instructions on file for this visit.

## 2022-10-26 NOTE — LETTER
October 26, 2022      [facility]  [address]  [place] [address]  Phone: [phone]  Fax: [phone]       Patient: Rebeca Ortega   YOB: 1995  Date of Visit: 10/26/2022    To Whom It May Concern:    Yang Ortega was seen at [facility] on 10/26/2022. The patient may return to work/school on 10/27/22 with no restrictions. If you have any questions or concerns, or if I can be of further assistance, please do not hesitate to contact me.    Sincerely,    Evelin Hill, NP

## 2022-12-22 ENCOUNTER — OFFICE VISIT (OUTPATIENT)
Dept: PRIMARY CARE CLINIC | Facility: CLINIC | Age: 27
End: 2022-12-22
Payer: COMMERCIAL

## 2022-12-22 ENCOUNTER — APPOINTMENT (OUTPATIENT)
Dept: RADIOLOGY | Facility: HOSPITAL | Age: 27
End: 2022-12-22
Attending: PHYSICIAN ASSISTANT
Payer: COMMERCIAL

## 2022-12-22 VITALS
BODY MASS INDEX: 25.62 KG/M2 | SYSTOLIC BLOOD PRESSURE: 118 MMHG | HEIGHT: 71 IN | WEIGHT: 183 LBS | TEMPERATURE: 99 F | DIASTOLIC BLOOD PRESSURE: 80 MMHG | HEART RATE: 71 BPM

## 2022-12-22 DIAGNOSIS — M79.89 SOFT TISSUE MASS: ICD-10-CM

## 2022-12-22 DIAGNOSIS — F90.9 ATTENTION DEFICIT HYPERACTIVITY DISORDER (ADHD), UNSPECIFIED ADHD TYPE: ICD-10-CM

## 2022-12-22 DIAGNOSIS — F90.9 ATTENTION DEFICIT HYPERACTIVITY DISORDER (ADHD), UNSPECIFIED ADHD TYPE: Primary | ICD-10-CM

## 2022-12-22 PROCEDURE — 76882 US SOFT TISSUE, UPPER EXTREMITY, LEFT: ICD-10-PCS | Mod: 26,LT,, | Performed by: RADIOLOGY

## 2022-12-22 PROCEDURE — 99214 PR OFFICE/OUTPT VISIT, EST, LEVL IV, 30-39 MIN: ICD-10-PCS | Mod: S$GLB,,, | Performed by: PHYSICIAN ASSISTANT

## 2022-12-22 PROCEDURE — 99999 PR PBB SHADOW E&M-EST. PATIENT-LVL III: ICD-10-PCS | Mod: PBBFAC,,, | Performed by: PHYSICIAN ASSISTANT

## 2022-12-22 PROCEDURE — 76882 US LMTD JT/FCL EVL NVASC XTR: CPT | Mod: TC,PO,LT

## 2022-12-22 PROCEDURE — 99214 OFFICE O/P EST MOD 30 MIN: CPT | Mod: S$GLB,,, | Performed by: PHYSICIAN ASSISTANT

## 2022-12-22 PROCEDURE — 76882 US LMTD JT/FCL EVL NVASC XTR: CPT | Mod: 26,LT,, | Performed by: RADIOLOGY

## 2022-12-22 PROCEDURE — 99999 PR PBB SHADOW E&M-EST. PATIENT-LVL III: CPT | Mod: PBBFAC,,, | Performed by: PHYSICIAN ASSISTANT

## 2022-12-22 RX ORDER — DEXTROAMPHETAMINE SACCHARATE, AMPHETAMINE ASPARTATE, DEXTROAMPHETAMINE SULFATE AND AMPHETAMINE SULFATE 2.5; 2.5; 2.5; 2.5 MG/1; MG/1; MG/1; MG/1
1 TABLET ORAL
Qty: 30 TABLET | Refills: 0 | Status: SHIPPED | OUTPATIENT
Start: 2022-12-22 | End: 2023-03-21 | Stop reason: SDUPTHER

## 2022-12-22 RX ORDER — METHYLPHENIDATE HYDROCHLORIDE 36 MG/1
36 TABLET ORAL EVERY MORNING
Qty: 30 TABLET | Refills: 0 | Status: SHIPPED | OUTPATIENT
Start: 2022-12-22 | End: 2023-02-16 | Stop reason: SDUPTHER

## 2022-12-22 NOTE — PROGRESS NOTES
"Subjective:      Patient ID: Rebeca Ortega is a 26 y.o. female.    Chief Complaint: Medication Refill    Rebeca Ortega is a 26 y.o. female who presents to clinic for ADD follow-up       ADD - on concerta 36 as well as adderall 10 mg around lunch as needed - only take meds when work.  No bp, appetite, sleep, anxiety, palpitations on med   On zoloft - doing great   Have felt lump in upper left arm that wanted to get checked out   Declines flu vaccine       Review of Systems   Constitutional:  Negative for activity change, appetite change, fatigue, fever and unexpected weight change.   HENT:  Negative for congestion, ear pain, sore throat and trouble swallowing.    Respiratory:  Negative for cough and shortness of breath.    Cardiovascular:  Negative for chest pain and palpitations.   Gastrointestinal:  Negative for abdominal distention, abdominal pain, constipation, diarrhea, nausea and vomiting.   Genitourinary:  Negative for difficulty urinating, frequency and urgency.   Musculoskeletal:  Negative for arthralgias and myalgias.   Skin:         Lump in left arm    Neurological:  Negative for dizziness, weakness and light-headedness.   Psychiatric/Behavioral:  Negative for decreased concentration and dysphoric mood. The patient is not nervous/anxious.      Objective:   /80   Pulse 71   Temp 98.9 °F (37.2 °C)   Ht 5' 11" (1.803 m)   Wt 83 kg (182 lb 15.7 oz)   BMI 25.52 kg/m²   Physical Exam  Vitals reviewed.   Constitutional:       General: She is not in acute distress.     Appearance: She is well-developed. She is not ill-appearing, toxic-appearing or diaphoretic.   HENT:      Head: Normocephalic and atraumatic.      Right Ear: External ear normal.      Left Ear: External ear normal.      Nose: Nose normal.   Cardiovascular:      Rate and Rhythm: Normal rate and regular rhythm.      Pulses:           Radial pulses are 2+ on the right side and 2+ on the left side.      Heart sounds: Normal heart sounds, " S1 normal and S2 normal.   Pulmonary:      Effort: Pulmonary effort is normal. No tachypnea, bradypnea, accessory muscle usage or respiratory distress.      Breath sounds: Normal breath sounds. No decreased breath sounds, wheezing, rhonchi or rales.   Chest:      Chest wall: No tenderness.   Skin:     General: Skin is warm and dry.      Capillary Refill: Capillary refill takes less than 2 seconds.      Comments: Soft, nontender, semimobile soft tissue mass in distal left upper arm near elbow    Neurological:      Mental Status: She is alert and oriented to person, place, and time. She is not disoriented.      Cranial Nerves: No cranial nerve deficit.      Sensory: No sensory deficit.      Motor: No abnormal muscle tone.   Psychiatric:         Behavior: Behavior normal.     Assessment:      1. Attention deficit hyperactivity disorder (ADHD), unspecified ADHD type    2. Soft tissue mass       Plan:   Attention deficit hyperactivity disorder (ADHD), unspecified ADHD type  Comments:  chronic, doing well on current meds, only take meds when work, cont. concerta, adderall 10 mg IR if needed, no inc. anxiety, sleep , bp, appetite issues on med     Soft tissue mass  Comments:  small soft tissue mass in left distal upper left arm near elbow - ultrasound for further eval   Orders:  -     US Soft Tissue Upper Extremity, Left; Future; Expected date: 12/22/2022    F/u in 3 mths with Dr. Ortez for ADD refill     10/26/2022  10/26/2022   1  Concerta Er 36 Mg Tablet 30.00  30  Em Kin  [id number]   Alb (2621)  0   [place]     10/18/2022  10/18/2022   1  Dextroamp-Amphetamin 10 Mg Tab 30.00  30  Em Kin  [id number]   Alb (2621)  0   [place]     08/26/2022  08/26/2022   1  Vyvanse 40 Mg Capsule 30.00  30  Em Kin  [id number]   Alb (2621)  0   [place]     08/26/2022  08/26/2022   1  Dextroamp-Amphetamin 10 Mg Tab 30.00  30  Em Kin  [id number]   Alb (2621)  0   [place]     02/20/2022 01/25/2022   2  Dextroamp-Amphetamin 20 Mg Tab  60.00  30  Em Kin  [id number]   Alb (2621)  0   [place]     11/16/2021 11/16/2021   2  Dextroamp-Amphetamin 20 Mg Tab 60.00  30  Em Kin  [id number]   Alb (2621)  0   [place]     03/02/2021 03/01/2021   1  Uwuuet-Wksznhkk-Esux -40 20.00  5  Em Kin  [id number]   Asha (4190)  0  2.00 LME  [place]      Kayley Correa PA-C   Physician Assistant   [facility]

## 2022-12-22 NOTE — PROGRESS NOTES
Rebeca Ortega,     I have sent the following medications to your preferred pharmacy on file. Please let me know if you have further questions.     Medications Ordered This Encounter   Medications    dextroamphetamine-amphetamine 10 mg Tab     Sig: Take 1 tablet (10 mg total) by mouth daily with lunch.     Dispense:  30 tablet     Refill:  0    methylphenidate HCl 36 MG CR tablet     Sig: Take 1 tablet (36 mg total) by mouth every morning.     Dispense:  30 tablet     Refill:  0          [id number] - [place] - [address] [facility].  [address] [facility].  [place] [address]  Phone: [phone] Fax: [phone]       Sincerely,   Kayley Ortez MD

## 2023-02-28 ENCOUNTER — OFFICE VISIT (OUTPATIENT)
Dept: URGENT CARE | Facility: CLINIC | Age: 28
End: 2023-02-28
Payer: COMMERCIAL

## 2023-02-28 VITALS
RESPIRATION RATE: 18 BRPM | HEART RATE: 88 BPM | WEIGHT: 182 LBS | OXYGEN SATURATION: 98 % | HEIGHT: 71 IN | BODY MASS INDEX: 25.48 KG/M2 | TEMPERATURE: 98 F | SYSTOLIC BLOOD PRESSURE: 102 MMHG | DIASTOLIC BLOOD PRESSURE: 88 MMHG

## 2023-02-28 DIAGNOSIS — K52.9 GASTROENTERITIS: Primary | ICD-10-CM

## 2023-02-28 PROCEDURE — 99214 OFFICE O/P EST MOD 30 MIN: CPT | Mod: S$GLB,,, | Performed by: NURSE PRACTITIONER

## 2023-02-28 PROCEDURE — 99214 PR OFFICE/OUTPT VISIT, EST, LEVL IV, 30-39 MIN: ICD-10-PCS | Mod: S$GLB,,, | Performed by: NURSE PRACTITIONER

## 2023-02-28 RX ORDER — ONDANSETRON 4 MG/1
4 TABLET, ORALLY DISINTEGRATING ORAL EVERY 6 HOURS PRN
Qty: 30 TABLET | Refills: 0 | Status: SHIPPED | OUTPATIENT
Start: 2023-02-28

## 2023-02-28 NOTE — PROGRESS NOTES
"Subjective:       Patient ID: Rebeca Ortega is a 27 y.o. female.    Vitals:  height is 5' 11" (1.803 m) and weight is 82.6 kg (182 lb). Her temperature is 98.2 °F (36.8 °C). Her blood pressure is 102/88 and her pulse is 88. Her respiration is 18 and oxygen saturation is 98%.     Chief Complaint: Emesis      Patient is a 27 year old female who presents to Urgent Care for evaluation of fever. Associated symptoms include diarrhea and vomiting. She reports symptoms started on yesterday. 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  Patient reports fever of on 102.4 a home.         Emesis   This is a new problem. The current episode started in the past 7 days. The problem occurs 5 to 10 times per day. The problem has been unchanged. The emesis has an appearance of stomach contents. The maximum temperature recorded prior to her arrival was 102 - 102.9 F. Associated symptoms include abdominal pain, chills, diarrhea and a fever. Pertinent negatives include no coughing or headaches. Associated symptoms comments: Diarrhea every 30 minutes.. Treatments tried: phenergan. The treatment provided mild relief.     Constitution: Positive for chills and fever.   Respiratory:  Negative for cough.    Gastrointestinal:  Positive for abdominal pain, vomiting and diarrhea.   Neurological:  Negative for headaches.     Objective:      Physical Exam   Constitutional: She is oriented to person, place, and time. She appears well-developed. She is cooperative.  Non-toxic appearance. She does not appear ill. No distress.   HENT:   Head: Normocephalic and atraumatic.   Ears:   Right Ear: Hearing, tympanic membrane, external ear and ear canal normal. No middle ear effusion.   Left Ear: Hearing, tympanic membrane, external ear and ear canal normal.  No middle ear effusion.   Nose: Nose normal. No mucosal edema, rhinorrhea or nasal deformity. No epistaxis. Right sinus exhibits no maxillary sinus tenderness and no frontal sinus tenderness. Left sinus " exhibits no maxillary sinus tenderness and no frontal sinus tenderness.   Mouth/Throat: Uvula is midline, oropharynx is clear and moist and mucous membranes are normal. Mucous membranes are moist. No trismus in the jaw. Normal dentition. No uvula swelling. No oropharyngeal exudate, posterior oropharyngeal edema, posterior oropharyngeal erythema, tonsillar abscesses or cobblestoning.      Comments: Mucus membranes moist   Eyes: Conjunctivae and lids are normal. No scleral icterus.   Neck: Trachea normal and phonation normal. Neck supple. No edema present. No erythema present. No neck rigidity present.   Cardiovascular: Normal rate, regular rhythm, normal heart sounds and normal pulses.   Pulmonary/Chest: Effort normal and breath sounds normal. No respiratory distress. She has no decreased breath sounds. She has no rhonchi.   Abdominal: Normal appearance and bowel sounds are normal. There is generalized abdominal tenderness. There is no rebound, no guarding, negative Brown's sign, no left CVA tenderness and no right CVA tenderness.   Musculoskeletal: Normal range of motion.         General: No deformity. Normal range of motion.   Neurological: She is alert and oriented to person, place, and time. She exhibits normal muscle tone. Coordination normal.   Skin: Skin is warm, dry, intact, not diaphoretic and not pale. Capillary refill takes less than 2 seconds.   Psychiatric: Her speech is normal and behavior is normal. Judgment and thought content normal.   Nursing note and vitals reviewed.      Assessment:       1. Gastroenteritis          Plan:         Gastroenteritis  -     ondansetron (ZOFRAN-ODT) 4 MG TbDL; Take 1 tablet (4 mg total) by mouth every 6 (six) hours as needed (nausea).  Dispense: 30 tablet; Refill: 0         Patient is a 27 year old female who presents to urgent care for evaluation of fever. Associated symptoms include nausea, vomiting and diarrhea. Patient reports she can tolerated liquids. She was  instructed on the importance of maintain fluid intact.       Gastroenteritis  If your condition worsens or fails to improve we recommend that you receive another evaluation at the ER immediately or contact your PCP to discuss your concerns or return here. You must understand that you've received an urgent care treatment only and that you may be released before all your medical problems are known or treated. You the patient will arrange for followup care as instructed.   If you have diarrhea you can use Pepto Bismol.   Increase fluids and rest is important   Start off with liquid diet and progress as tolerated (see below)  Water and clear liquids are important so you do not get dehydrated. Drink a small amount at a time.  Do not force yourself to eat, especially if you have cramps, vomiting, or diarrhea. When you finally decide to start eating, do not eat large amounts at a time, even if you are hungry.  If you eat, avoid fatty, greasy, spicy, or fried foods.  Do not eat dairy products if you have diarrhea; they can make the diarrhea worse  Watch for any increase pain, fever, localized pain to right lower abdomen or continued vomiting or diarrhea.

## 2023-03-21 DIAGNOSIS — F90.9 ATTENTION DEFICIT HYPERACTIVITY DISORDER (ADHD), UNSPECIFIED ADHD TYPE: ICD-10-CM

## 2023-03-21 RX ORDER — METHYLPHENIDATE HYDROCHLORIDE 36 MG/1
36 TABLET ORAL EVERY MORNING
Qty: 30 TABLET | Refills: 0 | Status: SHIPPED | OUTPATIENT
Start: 2023-03-21 | End: 2023-05-16 | Stop reason: SDUPTHER

## 2023-03-21 RX ORDER — DEXTROAMPHETAMINE SACCHARATE, AMPHETAMINE ASPARTATE, DEXTROAMPHETAMINE SULFATE AND AMPHETAMINE SULFATE 2.5; 2.5; 2.5; 2.5 MG/1; MG/1; MG/1; MG/1
1 TABLET ORAL
Qty: 30 TABLET | Refills: 0 | Status: SHIPPED | OUTPATIENT
Start: 2023-03-21 | End: 2023-05-16 | Stop reason: SDUPTHER

## 2023-03-21 NOTE — TELEPHONE ENCOUNTER
Rebeca Ortega,     I have sent the following medications to your preferred pharmacy on file. Please let me know if you have further questions.  Please assist her with making an appt to avoid delays in future refills of the medication.     Medications Ordered This Encounter   Medications    dextroamphetamine-amphetamine 10 mg Tab     Sig: Take 1 tablet (10 mg total) by mouth daily with lunch.     Dispense:  30 tablet     Refill:  0    methylphenidate HCl 36 MG CR tablet     Sig: Take 1 tablet (36 mg total) by mouth every morning.     Dispense:  30 tablet     Refill:  0          [id number] - [place] - [address].  [address] [facility].  [place] [address]  Phone: [phone] Fax: [phone]       Sincerely,   Kayley Ortez MD

## 2023-04-19 ENCOUNTER — TELEPHONE (OUTPATIENT)
Dept: PRIMARY CARE CLINIC | Facility: CLINIC | Age: 28
End: 2023-04-19
Payer: COMMERCIAL

## 2023-05-08 ENCOUNTER — TELEPHONE (OUTPATIENT)
Dept: PRIMARY CARE CLINIC | Facility: CLINIC | Age: 28
End: 2023-05-08
Payer: COMMERCIAL

## 2023-05-16 ENCOUNTER — OFFICE VISIT (OUTPATIENT)
Dept: PRIMARY CARE CLINIC | Facility: CLINIC | Age: 28
End: 2023-05-16
Payer: COMMERCIAL

## 2023-05-16 VITALS
SYSTOLIC BLOOD PRESSURE: 122 MMHG | HEART RATE: 71 BPM | OXYGEN SATURATION: 98 % | TEMPERATURE: 99 F | DIASTOLIC BLOOD PRESSURE: 78 MMHG | BODY MASS INDEX: 26.29 KG/M2 | RESPIRATION RATE: 18 BRPM | WEIGHT: 188.5 LBS

## 2023-05-16 DIAGNOSIS — F19.982 DRUG-INDUCED INSOMNIA: ICD-10-CM

## 2023-05-16 DIAGNOSIS — F41.9 ANXIETY: ICD-10-CM

## 2023-05-16 DIAGNOSIS — E66.3 OVERWEIGHT (BMI 25.0-29.9): ICD-10-CM

## 2023-05-16 DIAGNOSIS — F90.9 ATTENTION DEFICIT HYPERACTIVITY DISORDER (ADHD), UNSPECIFIED ADHD TYPE: Primary | ICD-10-CM

## 2023-05-16 PROCEDURE — 99214 OFFICE O/P EST MOD 30 MIN: CPT | Mod: S$GLB,,, | Performed by: NURSE PRACTITIONER

## 2023-05-16 PROCEDURE — 99999 PR PBB SHADOW E&M-EST. PATIENT-LVL III: ICD-10-PCS | Mod: PBBFAC,,, | Performed by: NURSE PRACTITIONER

## 2023-05-16 PROCEDURE — 99999 PR PBB SHADOW E&M-EST. PATIENT-LVL III: CPT | Mod: PBBFAC,,, | Performed by: NURSE PRACTITIONER

## 2023-05-16 PROCEDURE — 99214 PR OFFICE/OUTPT VISIT, EST, LEVL IV, 30-39 MIN: ICD-10-PCS | Mod: S$GLB,,, | Performed by: NURSE PRACTITIONER

## 2023-05-16 RX ORDER — METHYLPHENIDATE HYDROCHLORIDE 36 MG/1
36 TABLET ORAL EVERY MORNING
Qty: 30 TABLET | Refills: 0 | Status: SHIPPED | OUTPATIENT
Start: 2023-05-16 | End: 2023-07-25 | Stop reason: ALTCHOICE

## 2023-05-16 RX ORDER — DEXTROAMPHETAMINE SACCHARATE, AMPHETAMINE ASPARTATE, DEXTROAMPHETAMINE SULFATE AND AMPHETAMINE SULFATE 2.5; 2.5; 2.5; 2.5 MG/1; MG/1; MG/1; MG/1
1 TABLET ORAL
Qty: 30 TABLET | Refills: 0 | Status: SHIPPED | OUTPATIENT
Start: 2023-05-16 | End: 2024-01-29 | Stop reason: ALTCHOICE

## 2023-05-16 NOTE — PROGRESS NOTES
Chief Complaint  Chief Complaint   Patient presents with    Medication Refill     Pt presents to clinic for meds refill          HPI     HPI  Rebeca Ortega is a 27 y.o. female with medical diagnoses as listed in the medical history and problem list that presents for .ADHD  This patient is new to me.     ADHD: Stable on Adderall 10 mg and Concerta 36 mg. Takes Concerta daily and Adderall as needed. Weight is maintained. Regular  diet. Blood pressure is 122/78. No side effects or adverse reactions to report.    Wt Readings from Last 10 Encounters:   05/16/23 85.5 kg (188 lb 7.9 oz)   02/28/23 82.6 kg (182 lb)   12/22/22 83 kg (182 lb 15.7 oz)   10/26/22 78.4 kg (172 lb 11.7 oz)   08/26/22 82.2 kg (181 lb 3.5 oz)   05/11/22 81 kg (178 lb 7.4 oz)   11/16/21 78.6 kg (173 lb 4.5 oz)          History     PAST MEDICAL HISTORY:  History reviewed. No pertinent past medical history.    PAST SURGICAL HISTORY:  History reviewed. No pertinent surgical history.    SOCIAL HISTORY:  Social History     Socioeconomic History    Marital status:    Tobacco Use    Smoking status: Never    Smokeless tobacco: Never       FAMILY HISTORY:  History reviewed. No pertinent family history.    ALLERGIES AND MEDICATIONS: updated and reviewed.  Review of patient's allergies indicates:  No Known Allergies  Current Outpatient Medications   Medication Sig Dispense Refill    drospirenone-ethinyl estradioL (JOSSUE) 3-0.02 mg per tablet Take 1 tablet by mouth once daily.      ondansetron (ZOFRAN-ODT) 4 MG TbDL Take 1 tablet (4 mg total) by mouth every 6 (six) hours as needed (nausea). 30 tablet 0    dextroamphetamine-amphetamine 10 mg Tab Take 1 tablet (10 mg total) by mouth daily with lunch. 30 tablet 0    methylphenidate HCl 36 MG CR tablet Take 1 tablet (36 mg total) by mouth every morning. 30 tablet 0    sertraline (ZOLOFT) 50 MG tablet Take 1 tablet (50 mg total) by mouth once daily. 30 tablet 11     No current facility-administered  medications for this visit.           Exam     ROS  Review of Systems   Constitutional:  Negative for appetite change, chills, fatigue and fever.   HENT:  Negative for congestion, ear pain, postnasal drip, rhinorrhea, sinus pressure, sneezing and sore throat.    Respiratory:  Negative for shortness of breath.    Cardiovascular:  Negative for chest pain and palpitations.   Gastrointestinal:  Negative for abdominal pain, constipation, diarrhea, nausea and vomiting.   Genitourinary:  Negative for dysuria.   Musculoskeletal:  Negative for arthralgias.   Neurological:  Negative for headaches.         Physical Exam  Vitals:    05/16/23 1529   BP: 122/78   Pulse: 71   Resp: 18   Temp: 99.1 °F (37.3 °C)   SpO2: 98%   Weight: 85.5 kg (188 lb 7.9 oz)    Body mass index is 26.29 kg/m².  Weight: 85.5 kg (188 lb 7.9 oz)       Physical Exam  Constitutional:       General: She is not in acute distress.     Appearance: Normal appearance.   HENT:      Head: Normocephalic and atraumatic.      Right Ear: External ear normal.      Left Ear: External ear normal.      Nose: Nose normal.   Eyes:      Pupils: Pupils are equal, round, and reactive to light.   Cardiovascular:      Rate and Rhythm: Normal rate and regular rhythm.      Pulses: Normal pulses.   Pulmonary:      Effort: Pulmonary effort is normal. No respiratory distress.      Breath sounds: Normal breath sounds. No wheezing.   Abdominal:      General: Bowel sounds are normal.      Palpations: Abdomen is soft.   Musculoskeletal:      Cervical back: Neck supple.   Lymphadenopathy:      Cervical: No cervical adenopathy.   Skin:     General: Skin is warm and dry.   Neurological:      Mental Status: She is alert and oriented to person, place, and time.   Psychiatric:         Mood and Affect: Mood normal.           Health Maintenance         Date Due Completion Date    TETANUS VACCINE Never done ---    COVID-19 Vaccine (3 - Booster) 02/13/2022 12/19/2021    Influenza Vaccine (Season  Ended) 09/01/2023 ---    Pap Smear 05/11/2025 5/11/2022              Assessment & Plan     Assessment & Plan  Problem List Items Addressed This Visit          Psychiatric    Attention deficit hyperactivity disorder - Primary  -[place]  Reviewed  -The current medical regimen is effective;  continue present plan and medications.   - Labs ordered for anticipated Annual exam in 2 months    Relevant Medications    methylphenidate HCl 36 MG CR tablet    dextroamphetamine-amphetamine 10 mg Tab    Other Relevant Orders    CBC Auto Differential    Comprehensive Metabolic Panel    TSH     Other Visit Diagnoses       Anxiety        Drug-induced insomnia      - Stable    Overweight (BMI 25.0-29.9)      -Diet and exercise discussed with patient.     Relevant Orders    Lipid Panel    TSH              Health Maintenance reviewed: Deferred per patient    Follow-up: 2 Months for Annual Physical Exam    30+ minutes of total time spent on the encounter, which includes face to face time and non-face to face time preparing to see the patient (eg, review of tests), Obtaining and/or reviewing separately obtained history, documenting clinical information in the electronic or other health record, independently interpreting results (not separately reported) and communicating results to the patient/family/caregiver, or Care coordination (not separately reported).

## 2023-07-17 ENCOUNTER — LAB VISIT (OUTPATIENT)
Dept: LAB | Facility: HOSPITAL | Age: 28
End: 2023-07-17
Attending: FAMILY MEDICINE
Payer: COMMERCIAL

## 2023-07-17 DIAGNOSIS — F90.9 ATTENTION DEFICIT HYPERACTIVITY DISORDER (ADHD), UNSPECIFIED ADHD TYPE: ICD-10-CM

## 2023-07-17 DIAGNOSIS — E66.3 OVERWEIGHT (BMI 25.0-29.9): ICD-10-CM

## 2023-07-17 LAB
ALBUMIN SERPL BCP-MCNC: 4.2 G/DL (ref 3.5–5.2)
ALP SERPL-CCNC: 46 U/L (ref 55–135)
ALT SERPL W/O P-5'-P-CCNC: 13 U/L (ref 10–44)
ANION GAP SERPL CALC-SCNC: 11 MMOL/L (ref 8–16)
AST SERPL-CCNC: 17 U/L (ref 10–40)
BASOPHILS # BLD AUTO: 0.04 K/UL (ref 0–0.2)
BASOPHILS NFR BLD: 0.9 % (ref 0–1.9)
BILIRUB SERPL-MCNC: 0.4 MG/DL (ref 0.1–1)
BUN SERPL-MCNC: 10 MG/DL (ref 6–20)
CALCIUM SERPL-MCNC: 9.4 MG/DL (ref 8.7–10.5)
CHLORIDE SERPL-SCNC: 111 MMOL/L (ref 95–110)
CHOLEST SERPL-MCNC: 156 MG/DL (ref 120–199)
CHOLEST/HDLC SERPL: 2.9 {RATIO} (ref 2–5)
CO2 SERPL-SCNC: 19 MMOL/L (ref 23–29)
CREAT SERPL-MCNC: 0.8 MG/DL (ref 0.5–1.4)
DIFFERENTIAL METHOD: ABNORMAL
EOSINOPHIL # BLD AUTO: 0.1 K/UL (ref 0–0.5)
EOSINOPHIL NFR BLD: 1.7 % (ref 0–8)
ERYTHROCYTE [DISTWIDTH] IN BLOOD BY AUTOMATED COUNT: 12.1 % (ref 11.5–14.5)
EST. GFR  (NO RACE VARIABLE): >60 ML/MIN/1.73 M^2
GLUCOSE SERPL-MCNC: 85 MG/DL (ref 70–110)
HCT VFR BLD AUTO: 42.9 % (ref 37–48.5)
HDLC SERPL-MCNC: 54 MG/DL (ref 40–75)
HDLC SERPL: 34.6 % (ref 20–50)
HGB BLD-MCNC: 14.4 G/DL (ref 12–16)
IMM GRANULOCYTES # BLD AUTO: 0.01 K/UL (ref 0–0.04)
IMM GRANULOCYTES NFR BLD AUTO: 0.2 % (ref 0–0.5)
LDLC SERPL CALC-MCNC: 92.4 MG/DL (ref 63–159)
LYMPHOCYTES # BLD AUTO: 1.3 K/UL (ref 1–4.8)
LYMPHOCYTES NFR BLD: 28 % (ref 18–48)
MCH RBC QN AUTO: 33.8 PG (ref 27–31)
MCHC RBC AUTO-ENTMCNC: 33.6 G/DL (ref 32–36)
MCV RBC AUTO: 101 FL (ref 82–98)
MONOCYTES # BLD AUTO: 0.3 K/UL (ref 0.3–1)
MONOCYTES NFR BLD: 6.2 % (ref 4–15)
NEUTROPHILS # BLD AUTO: 3 K/UL (ref 1.8–7.7)
NEUTROPHILS NFR BLD: 63 % (ref 38–73)
NONHDLC SERPL-MCNC: 102 MG/DL
NRBC BLD-RTO: 0 /100 WBC
PLATELET # BLD AUTO: 193 K/UL (ref 150–450)
PMV BLD AUTO: 13.4 FL (ref 9.2–12.9)
POTASSIUM SERPL-SCNC: 4.9 MMOL/L (ref 3.5–5.1)
PROT SERPL-MCNC: 6.9 G/DL (ref 6–8.4)
RBC # BLD AUTO: 4.26 M/UL (ref 4–5.4)
SODIUM SERPL-SCNC: 141 MMOL/L (ref 136–145)
TRIGL SERPL-MCNC: 48 MG/DL (ref 30–150)
TSH SERPL DL<=0.005 MIU/L-ACNC: 1.98 UIU/ML (ref 0.4–4)
WBC # BLD AUTO: 4.68 K/UL (ref 3.9–12.7)

## 2023-07-17 PROCEDURE — 80053 COMPREHEN METABOLIC PANEL: CPT | Performed by: NURSE PRACTITIONER

## 2023-07-17 PROCEDURE — 84443 ASSAY THYROID STIM HORMONE: CPT | Performed by: NURSE PRACTITIONER

## 2023-07-17 PROCEDURE — 85025 COMPLETE CBC W/AUTO DIFF WBC: CPT | Performed by: NURSE PRACTITIONER

## 2023-07-17 PROCEDURE — 80061 LIPID PANEL: CPT | Performed by: NURSE PRACTITIONER

## 2023-07-17 PROCEDURE — 36415 COLL VENOUS BLD VENIPUNCTURE: CPT | Mod: PO | Performed by: NURSE PRACTITIONER

## 2023-07-25 ENCOUNTER — OFFICE VISIT (OUTPATIENT)
Dept: PRIMARY CARE CLINIC | Facility: CLINIC | Age: 28
End: 2023-07-25
Payer: COMMERCIAL

## 2023-07-25 VITALS
DIASTOLIC BLOOD PRESSURE: 80 MMHG | BODY MASS INDEX: 26.85 KG/M2 | HEIGHT: 71 IN | WEIGHT: 191.81 LBS | HEART RATE: 77 BPM | TEMPERATURE: 98 F | SYSTOLIC BLOOD PRESSURE: 128 MMHG | OXYGEN SATURATION: 98 %

## 2023-07-25 DIAGNOSIS — F90.9 ATTENTION DEFICIT HYPERACTIVITY DISORDER (ADHD), UNSPECIFIED ADHD TYPE: ICD-10-CM

## 2023-07-25 DIAGNOSIS — Z00.00 ROUTINE GENERAL MEDICAL EXAMINATION AT A HEALTH CARE FACILITY: Primary | ICD-10-CM

## 2023-07-25 DIAGNOSIS — F41.9 ANXIETY: ICD-10-CM

## 2023-07-25 PROCEDURE — 99395 PR PREVENTIVE VISIT,EST,18-39: ICD-10-PCS | Mod: S$GLB,,, | Performed by: FAMILY MEDICINE

## 2023-07-25 PROCEDURE — 99999 PR PBB SHADOW E&M-EST. PATIENT-LVL III: ICD-10-PCS | Mod: PBBFAC,,, | Performed by: FAMILY MEDICINE

## 2023-07-25 PROCEDURE — 99999 PR PBB SHADOW E&M-EST. PATIENT-LVL III: CPT | Mod: PBBFAC,,, | Performed by: FAMILY MEDICINE

## 2023-07-25 PROCEDURE — 99395 PREV VISIT EST AGE 18-39: CPT | Mod: S$GLB,,, | Performed by: FAMILY MEDICINE

## 2023-07-25 RX ORDER — LISDEXAMFETAMINE DIMESYLATE 40 MG/1
40 CAPSULE ORAL DAILY
Qty: 30 CAPSULE | Refills: 0 | Status: SHIPPED | OUTPATIENT
Start: 2023-07-25 | End: 2023-10-05 | Stop reason: SDUPTHER

## 2023-07-25 RX ORDER — FLUOXETINE HYDROCHLORIDE 20 MG/1
20 CAPSULE ORAL DAILY
Qty: 90 CAPSULE | Refills: 3 | Status: SHIPPED | OUTPATIENT
Start: 2023-07-25

## 2023-07-25 NOTE — Clinical Note
Can you see about this pt's vyvanse? She was on before last year, but told then it wasn't covered. She tried adderall and concerta in the past. Desires to get back on vyvanse. Thanks so much! Kayley Ortez MD

## 2023-07-25 NOTE — PROGRESS NOTES
"Subjective:      Patient ID: Rebeca Ortega is a 27 y.o. female.    Chief Complaint: Follow-up and Medication Problem      Patient is a 27 y.o. female coming in today for medication issues.  Currently on Concerta and Zoloft. States she continues to have anxiety regularly and has had increased concentration deficit. Pt used to be on Vyvnase last year; insurance no longer covers medication. States she has also gained some weight since switching from Vyvanse to Concerta. No other health concerns at this time.        No questionnaires on file.    Pmh, Psh, Family Hx, Social Hx, HM updated in Epic Tabs today.   Review of Systems   Constitutional:  Negative for chills, fatigue and fever.   HENT:  Negative for ear pain and trouble swallowing.    Eyes:  Negative for pain and visual disturbance.   Respiratory:  Negative for cough and shortness of breath.    Cardiovascular:  Negative for chest pain and leg swelling.   Gastrointestinal:  Negative for abdominal pain, blood in stool, nausea and vomiting.   Endocrine: Negative for cold intolerance and heat intolerance.   Genitourinary:  Negative for dysuria and frequency.   Musculoskeletal:  Negative for joint swelling, myalgias and neck pain.   Skin:  Negative for color change and rash.   Neurological:  Negative for dizziness and headaches.   Psychiatric/Behavioral:  Negative for behavioral problems and sleep disturbance.    Objective:     Vitals:    07/25/23 1038   BP: 128/80   Pulse: 77   Temp: 97.7 °F (36.5 °C)   SpO2: 98%   Weight: 87 kg (191 lb 12.8 oz)   Height: 5' 11" (1.803 m)     Wt Readings from Last 10 Encounters:   07/25/23 87 kg (191 lb 12.8 oz)   05/16/23 85.5 kg (188 lb 7.9 oz)   02/28/23 82.6 kg (182 lb)   12/22/22 83 kg (182 lb 15.7 oz)   10/26/22 78.4 kg (172 lb 11.7 oz)   08/26/22 82.2 kg (181 lb 3.5 oz)   05/11/22 81 kg (178 lb 7.4 oz)   11/16/21 78.6 kg (173 lb 4.5 oz)     Physical Exam  Vitals reviewed.   Constitutional:       Appearance: Normal " appearance. She is well-developed and overweight.   HENT:      Head: Normocephalic and atraumatic.      Right Ear: Tympanic membrane and external ear normal.      Left Ear: Tympanic membrane and external ear normal.      Nose: Nose normal.      Mouth/Throat:      Mouth: Mucous membranes are moist.      Pharynx: Oropharynx is clear.   Eyes:      Conjunctiva/sclera: Conjunctivae normal.      Pupils: Pupils are equal, round, and reactive to light.   Neck:      Thyroid: No thyromegaly.   Cardiovascular:      Rate and Rhythm: Normal rate and regular rhythm.      Heart sounds: Normal heart sounds. No murmur heard.    No friction rub. No gallop.   Pulmonary:      Effort: Pulmonary effort is normal. No respiratory distress.      Breath sounds: Normal breath sounds. No wheezing or rales.   Abdominal:      General: Bowel sounds are normal. There is no distension.      Palpations: Abdomen is soft.      Tenderness: There is no abdominal tenderness. There is no rebound.   Musculoskeletal:         General: Normal range of motion.      Cervical back: Normal range of motion and neck supple.   Lymphadenopathy:      Cervical: No cervical adenopathy.   Skin:     General: Skin is warm and dry.      Findings: No rash.   Neurological:      Mental Status: She is alert and oriented to person, place, and time.   Psychiatric:         Attention and Perception: Attention and perception normal.         Mood and Affect: Affect normal. Mood is anxious.         Speech: Speech normal.         Behavior: Behavior normal.         Thought Content: Thought content normal.         Cognition and Memory: Cognition and memory normal.         Judgment: Judgment normal.       Assessment:     1. Routine general medical examination at a health care facility    2. Attention deficit hyperactivity disorder (ADHD), unspecified ADHD type    3. Anxiety        Plan:   Rebeca was seen today for follow-up and medication problem.    Diagnoses and all orders for this  visit:    Routine general medical examination at a health care facility    Attention deficit hyperactivity disorder (ADHD), unspecified ADHD type  -     lisdexamfetamine (VYVANSE) 40 MG Cap; Take 1 capsule (40 mg total) by mouth once daily.    Anxiety  -     FLUoxetine 20 MG capsule; Take 1 capsule (20 mg total) by mouth once daily.      ADHD and anxiety are not controlled at this time. Pt to restart Vyvanse 40 mg/day for ADHD treatment and Fluoxetine 20 mg/day for anxiety treatment.  reviewed.   Instructed to continue taking Zoloft as prescribed.   Instructed to f/u in 3 months via telemedicine for ADHD f/u and in 1 year in person for annual exam.     There are no Patient Instructions on file for this visit.    Follow up in about 1 year (around 7/25/2024) for physical with Dr OTERO.      LABS:   Lab Results   Component Value Date    HGBA1C 4.7 11/16/2021      Lab Results   Component Value Date    CHOL 156 07/17/2023    CHOL 165 11/16/2021     Lab Results   Component Value Date    LDLCALC 92.4 07/17/2023    LDLCALC 83.4 11/16/2021     Lab Results   Component Value Date    WBC 4.68 07/17/2023    HGB 14.4 07/17/2023    HCT 42.9 07/17/2023     07/17/2023    CHOL 156 07/17/2023    TRIG 48 07/17/2023    HDL 54 07/17/2023    ALT 13 07/17/2023    AST 17 07/17/2023     07/17/2023    K 4.9 07/17/2023     (H) 07/17/2023    CREATININE 0.8 07/17/2023    BUN 10 07/17/2023    CO2 19 (L) 07/17/2023    TSH 1.978 07/17/2023    HGBA1C 4.7 11/16/2021       The ASCVD Risk score (Guillermo DK, et al., 2019) failed to calculate for the following reasons:    The 2019 ASCVD risk score is only valid for ages 40 to 79  US Soft Tissue Upper Extremity, Left  Narrative: EXAMINATION:  US SOFT TISSUE, UPPER EXTREMITY, LEFT    CLINICAL HISTORY:  Other specified soft tissue disorders    TECHNIQUE:  Targeted ultrasound evaluation performed for a palpable lump in the left upper arm.    COMPARISON:  None.    FINDINGS:  There is an  oval circumscribed hyperechoic nodule identified in the area of clinical interest measuring 1.5 x 0.8 x 1.5 cm.  Impression: Hyperechoic nodule in the left upper arm consistent with benign lipoma.    Electronically signed by: MAGDY Root MD  Date:    12/22/2022  Time:    14:49    Scribe Attestation:   I, Jonnathan Anderson, am scribing for, and in the presence of, Dr. Kayley Ortez MD. I performed the above scribed service and the documentation accurately describes the services I performed. I attest to the accuracy of the note.    I, Dr. Kayley Ortez MD, reviewed documentation as scribed above. I personally performed the services described in this documentation.  I agree that the record reflects my personal performance and is accurate and complete. Kayley Ortez MD.    07/25/2023

## 2023-10-05 DIAGNOSIS — F90.9 ATTENTION DEFICIT HYPERACTIVITY DISORDER (ADHD), UNSPECIFIED ADHD TYPE: ICD-10-CM

## 2023-10-06 RX ORDER — LISDEXAMFETAMINE DIMESYLATE 40 MG/1
40 CAPSULE ORAL DAILY
Qty: 30 CAPSULE | Refills: 0 | Status: SHIPPED | OUTPATIENT
Start: 2023-10-06 | End: 2023-10-31 | Stop reason: ALTCHOICE

## 2023-10-06 NOTE — TELEPHONE ENCOUNTER
No care due was identified.  [facility] Embedded Care Due Messages. Reference number: [id number].   10/05/2023 10:14:47 PM CDT

## 2023-10-31 ENCOUNTER — OFFICE VISIT (OUTPATIENT)
Dept: PRIMARY CARE CLINIC | Facility: CLINIC | Age: 28
End: 2023-10-31
Payer: COMMERCIAL

## 2023-10-31 DIAGNOSIS — N92.0 MENORRHAGIA WITH REGULAR CYCLE: ICD-10-CM

## 2023-10-31 DIAGNOSIS — F41.1 GENERALIZED ANXIETY DISORDER: ICD-10-CM

## 2023-10-31 DIAGNOSIS — F90.9 ATTENTION DEFICIT HYPERACTIVITY DISORDER (ADHD), UNSPECIFIED ADHD TYPE: Primary | ICD-10-CM

## 2023-10-31 PROBLEM — K62.9 ANAL LESION: Status: ACTIVE | Noted: 2023-06-12

## 2023-10-31 PROCEDURE — 99214 OFFICE O/P EST MOD 30 MIN: CPT | Mod: 95,,, | Performed by: FAMILY MEDICINE

## 2023-10-31 PROCEDURE — 99214 PR OFFICE/OUTPT VISIT, EST, LEVL IV, 30-39 MIN: ICD-10-PCS | Mod: 95,,, | Performed by: FAMILY MEDICINE

## 2023-10-31 RX ORDER — LISDEXAMFETAMINE DIMESYLATE 50 MG/1
50 CAPSULE ORAL EVERY MORNING
Qty: 30 CAPSULE | Refills: 0 | Status: SHIPPED | OUTPATIENT
Start: 2023-10-31 | End: 2024-01-05 | Stop reason: SDUPTHER

## 2023-10-31 RX ORDER — LEUCOVORIN, FOLIC ACID, LEVOMEFOLATE MAGNESIUM, FERROUS CYSTEINE GLYCINATE, 1,2-DOCOSAHEXANOYL-SN-GLYCERO-3-PHOSPHOSERINE CALCIUM, 1,2-ICOSAPENTOYL-SN-GLYCERO-3-PHOSPHOSERINE CALCIUM, PHOSPHATIDYL SERINE, PYRIDOXAL 5-PHOSPHATE, FLAVIN ADENINE DINUCLEOTIDE, NADH, COBAMAMIDE, COCARBOXYLASE (THIAMINE PYROPHOSPHATE), MAGNESIUM ASCORBATE, ZINC ASCORBATE, MAGNESIUM L-THREONATE AND BETAINE 2.5; 1; 7; 13.6; 6.4; 800; 12; 25; 25; 25; 50; 25; 24; 1; 1; 500; 1.83; 3.67 MG/1; MG/1; MG/1; MG/1; MG/1; UG/1; MG/1; UG/1; UG/1; UG/1; UG/1; UG/1; MG/1; MG/1; MG/1; UG/1; MG/1; MG/1
CAPSULE, DELAYED RELEASE PELLETS ORAL
Qty: 100 EACH | Refills: 3 | Status: SHIPPED | OUTPATIENT
Start: 2023-10-31

## 2023-10-31 NOTE — PROGRESS NOTES
Subjective:      Patient ID: Rebeca Ortega is a 27 y.o. female.    Chief Complaint: Follow-up (3 month follow ADDER ALL )    The patient location is: home  Visit type: video and audio simultaneous    Primary Care Telemedicine Note  The patient location is:  Patient Home   The chief complaint leading to consultation is: Patient presents with:  Follow-up: 3 month follow ADDER ALL     Patient has been doing well.  She is been on the generic Vyvanse 40 mg this past month prior to that was on brand-name Vyvanse at 40 mg.  She feels like it is only lasting about 5 hours though.  Previously was on Concerta but had difficulty obtaining the medication.  She has used immediate release Adderall 10 mg in the past if necessary for longer days.  She would prefer to only use 1 tablet a day though.    She is off her birth control currently she reports heavier menstrual cycles since doing so.  She did have an elevated MCV on her last set of blood work but was not anemic.      She also did have a rectal polyp that was removed recently after an emergency room visit with rectal bleeding.  She ended up with a colonoscopy as well.    [facility] Reason For Visit    10/31/2023  4:00 PM CDT - Filed by Patient   What is your primary reason for visit? Other/Annual   Have you experienced any of the following:   Change in activity? No   Unexpected weight change? No   Neck pain? No   Hearing loss? No   Runny nose? No   Trouble swallowing? No   Eye discharge? No   Changes in vision? No   Chest tightness? No   Wheezing? No   Chest pain? No   Heart beating fast or racing? No   Blood in stool? No   Constipation? No   Vomiting? No   Diarrhea? No   Drinking much more than usual? No   Urinating much more than usual? No   Difficulty urinating? No   Blood in the urine? No   Menstrual problem? No   Painful urination? No   Joint swelling? No   Joint pain? No   Headaches? No   Weakness? No   Confusion? No   Feeling depressed? No         Pmh, Psh, Family Hx,  Social Hx updated in Epic Tabs today.     LABS:   Lab Results   Component Value Date    WBC 4.68 07/17/2023    HGB 14.4 07/17/2023    HCT 42.9 07/17/2023     07/17/2023    CHOL 156 07/17/2023    TRIG 48 07/17/2023    HDL 54 07/17/2023    ALT 13 07/17/2023    AST 17 07/17/2023     07/17/2023    K 4.9 07/17/2023     (H) 07/17/2023    CREATININE 0.8 07/17/2023    BUN 10 07/17/2023    CO2 19 (L) 07/17/2023    TSH 1.978 07/17/2023    HGBA1C 4.7 11/16/2021       US Soft Tissue Upper Extremity, Left  Narrative: EXAMINATION:  US SOFT TISSUE, UPPER EXTREMITY, LEFT    CLINICAL HISTORY:  Other specified soft tissue disorders    TECHNIQUE:  Targeted ultrasound evaluation performed for a palpable lump in the left upper arm.    COMPARISON:  None.    FINDINGS:  There is an oval circumscribed hyperechoic nodule identified in the area of clinical interest measuring 1.5 x 0.8 x 1.5 cm.  Impression: Hyperechoic nodule in the left upper arm consistent with benign lipoma.    Electronically signed by: MAGDY Root MD  Date:    12/22/2022  Time:    14:49        Review of Systems   Constitutional:  Negative for activity change and unexpected weight change.   HENT:  Negative for hearing loss, rhinorrhea and trouble swallowing.    Eyes:  Negative for discharge and visual disturbance.   Respiratory:  Negative for chest tightness and wheezing.    Cardiovascular:  Negative for chest pain and palpitations.   Gastrointestinal:  Negative for blood in stool, constipation, diarrhea and vomiting.   Endocrine: Negative for polydipsia and polyuria.   Genitourinary:  Negative for difficulty urinating, dysuria, hematuria and menstrual problem.   Musculoskeletal:  Negative for arthralgias, joint swelling and neck pain.   Neurological:  Negative for weakness and headaches.   Psychiatric/Behavioral:  Negative for confusion and dysphoric mood.      Objective:   There were no vitals filed for this visit.  Wt Readings from Last 10  Encounters:   07/25/23 87 kg (191 lb 12.8 oz)   05/16/23 85.5 kg (188 lb 7.9 oz)   02/28/23 82.6 kg (182 lb)   12/22/22 83 kg (182 lb 15.7 oz)   10/26/22 78.4 kg (172 lb 11.7 oz)   08/26/22 82.2 kg (181 lb 3.5 oz)   05/11/22 81 kg (178 lb 7.4 oz)   11/16/21 78.6 kg (173 lb 4.5 oz)     Physical Exam  Vitals reviewed.   Constitutional:       General: She is awake. She is not in acute distress.     Appearance: Normal appearance. She is well-developed, well-groomed and normal weight. She is not ill-appearing.   HENT:      Head: Normocephalic and atraumatic.      Right Ear: External ear normal.      Left Ear: External ear normal.      Nose: Nose normal.      Mouth/Throat:      Lips: Pink.   Eyes:      Conjunctiva/sclera: Conjunctivae normal.   Pulmonary:      Effort: Pulmonary effort is normal.   Neurological:      Mental Status: She is alert.   Psychiatric:         Attention and Perception: Attention and perception normal. She is attentive.         Mood and Affect: Mood and affect normal. Mood is not anxious or depressed. Affect is not labile, blunt, angry or inappropriate.         Speech: Speech normal. She is communicative. Speech is not rapid and pressured, delayed, slurred or tangential.         Behavior: Behavior normal. Behavior is not agitated, slowed, aggressive, withdrawn, hyperactive or combative. Behavior is cooperative.         Thought Content: Thought content normal. Thought content is not paranoid or delusional. Thought content does not include homicidal or suicidal ideation. Thought content does not include homicidal or suicidal plan.         Cognition and Memory: Cognition and memory normal. Memory is not impaired. She does not exhibit impaired recent memory or impaired remote memory.         Judgment: Judgment normal. Judgment is not impulsive or inappropriate.     Assessment:     1. Attention deficit hyperactivity disorder (ADHD), unspecified ADHD type    2. Generalized anxiety disorder    3.  Menorrhagia with regular cycle      Plan:   Rebeca was seen today for follow-up.    Diagnoses and all orders for this visit:    Attention deficit hyperactivity disorder (ADHD), unspecified ADHD type  Comments:  not controlled, vyvanse 40mg lasting 5 hrs. increase to 50mg.   is been reviewed.  If not effective then consider going back down to 40 mg daily and adding immediate release Adderall or possibly twice daily dosing of Vyvanse.    Generalized anxiety disorder-stable with Prozac 20 mg doing well    Menorrhagia with regular cycle-off birth control at this time recommend adding in folic acid an iron supplementation due to elevated MCV and heavier menstrual cycles.    Other orders  -     lisdexamfetamine (VYVANSE) 50 MG capsule; Take 1 capsule (50 mg total) by mouth every morning.  -     iron-FA-dha-epa-FAD-NADH-be-mv (ENLYTE) 1.5 mg iron- 8.73 mg CpID; 1 tablet po daily       reviewed:   10/11/2023  10/06/2023   3  Lisdexamfetamine 40 Mg Capsule 30.00  30  [facility]  [id number]   [place] (3696)  0   [place] 07/25/2023 07/25/2023   3  Vyvanse 40 Mg Capsule 30.00  30  [facility]  [id number]   [place] (3696)  0   [place] 06/22/2023 06/22/2023   3  Oxycodone Hcl (Ir) 5 Mg Tablet 20.00  7  [facility]  [id number]   [place] (1313)  0  21.43 MME  [place] 05/16/2023 05/16/2023   2  Methylphenidate Er 36 Mg Tab 30.00  30  [facility]  [id number]   [place] (2621)  0   [place] 05/16/2023 05/16/2023   2  Dextroamp-Amphetamin 10 Mg Tab 30.00  30  [facility]  [id number]   [place] (2621)  0   [place]    03/21/2023 03/21/2023   2  Methylphenidate Er 36 Mg Tab 30.00  30  [facility]  [id number]   [place] (2621)  0   [place]    03/21/2023 03/21/2023   2  Dextroamp-Amphetamin 10 Mg Tab 30.00  30  [facility]  [id number]   [place] (2621)  0   [place]        Face to Face time with patient: 3:58 PM-415pm          25 minutes of total time spent on the encounter, which includes face to face time and non-face to face time preparing to see the patient  (eg, review of tests), Obtaining and/or reviewing separately obtained history, Documenting clinical information in the electronic or other health record, Independently interpreting results (not separately reported) and communicating results to the patient/family/caregiver, or Care coordination (not separately reported).     Each patient to whom he or she provides medical services by telemedicine is:  (1) informed of the relationship between the physician and patient and the respective role of any other health care provider with respect to management of the patient; and (2) notified that he or she may decline to receive medical services by telemedicine and may withdraw from such care at any time.      Follow up in about 3 months (around 1/31/2024) for f/u Telemed Dr Ortez/ jarrell .    There are no Patient Instructions on file for this visit.

## 2023-12-16 ENCOUNTER — OFFICE VISIT (OUTPATIENT)
Dept: URGENT CARE | Facility: CLINIC | Age: 28
End: 2023-12-16
Payer: COMMERCIAL

## 2023-12-16 VITALS
WEIGHT: 194.56 LBS | RESPIRATION RATE: 18 BRPM | TEMPERATURE: 98 F | HEIGHT: 72 IN | SYSTOLIC BLOOD PRESSURE: 129 MMHG | OXYGEN SATURATION: 98 % | BODY MASS INDEX: 26.35 KG/M2 | DIASTOLIC BLOOD PRESSURE: 72 MMHG | HEART RATE: 71 BPM

## 2023-12-16 DIAGNOSIS — S62.342A CLOSED NONDISPLACED FRACTURE OF BASE OF THIRD METACARPAL BONE OF RIGHT HAND, INITIAL ENCOUNTER: Primary | ICD-10-CM

## 2023-12-16 DIAGNOSIS — S69.91XA HAND INJURY, RIGHT, INITIAL ENCOUNTER: ICD-10-CM

## 2023-12-16 PROCEDURE — 73130 X-RAY EXAM OF HAND: CPT | Mod: RT,S$GLB,, | Performed by: RADIOLOGY

## 2023-12-16 PROCEDURE — 73130 XR HAND COMPLETE 3 VIEW RIGHT: ICD-10-PCS | Mod: RT,S$GLB,, | Performed by: RADIOLOGY

## 2023-12-16 PROCEDURE — 99203 OFFICE O/P NEW LOW 30 MIN: CPT | Mod: S$GLB,,,

## 2023-12-16 PROCEDURE — 99203 PR OFFICE/OUTPT VISIT, NEW, LEVL III, 30-44 MIN: ICD-10-PCS | Mod: S$GLB,,,

## 2023-12-16 NOTE — PATIENT INSTRUCTIONS
R.I.C.E:    Rest, apply ice intermittently, compress with splint, and elevate above heart level as much as possible.  Do not apply ice directly to skin. Wrap ice in cloth before applying.    OTC Tylenol (acetaminophen) up to 4,000 mg a day is safe for short periods and can be used for pain, and fever. However in high doses and prolonged use it can cause liver irritation.    OTC Ibuprofen (NSAID) is a non-steroidal anti-inflammatory that can be used for pain. However it can also cause stomach irritation if over used.    Of note: Aleve, Motrin, Advil, Mobic, meloxicam, and indomethacin are also other names of NSAIDs.    You will need to follow-up with orthopedics if your symptoms persist, as we discussed.    AMBULATORY REFERRAL ORDERED:   Someone will call with an appointment this week.  Please answer all unknown calls.  If you are not contacted within 7 days, Please call clinic  for status of appointment. ([phone])

## 2023-12-16 NOTE — PROGRESS NOTES
Subjective:      Patient ID: Rebeca Ortega is a 27 y.o. female.    Vitals:  height is 6' (1.829 m) and weight is 88.3 kg (194 lb 8.9 oz). Her oral temperature is 98.1 °F (36.7 °C). Her blood pressure is 129/72 and her pulse is 71. Her respiration is 18 and oxygen saturation is 98%.     Chief Complaint: Hand Injury    28 y/o right hand dominant female presents with right hand pain after she fell while on the horse. Patient stated that she is unsure if the part of the horse fell on her hand or if it was from the saddle that hurt her or if she hurt her hand falling off the horse. Patient treated with motrin, aleve and ice. Noticed today if was more swollen and felt tight. States at rest, pain is mild. Movement causes 5/10 pain. Denies wrist pain. States she can make a loose fist without issue. No nail injuries. NKDA.     Hand Injury   Her dominant hand is their right hand. The incident occurred 12 to 24 hours ago. The injury mechanism is unknown. The pain is present in the right hand. The pain is at a severity of 5/10. Pertinent negatives include no numbness.       Constitution: Negative for chills and fever.   Musculoskeletal:  Positive for pain and joint swelling.   Skin:  Positive for bruising. Negative for erythema.   Neurological:  Negative for numbness and tingling.      Objective:     Vitals:    12/16/23 1125   BP: 129/72   Pulse: 71   Resp: 18   Temp: 98.1 °F (36.7 °C)       Physical Exam   Constitutional: She is oriented to person, place, and time. She appears well-developed.  Non-toxic appearance. She does not appear ill. No distress.   HENT:   Head: Normocephalic and atraumatic. Head is without abrasion, without contusion and without laceration.   Ears:   Right Ear: External ear normal.   Left Ear: External ear normal.   Nose: Nose normal.   Mouth/Throat: Oropharynx is clear and moist and mucous membranes are normal.   Eyes: Conjunctivae, EOM and lids are normal. Pupils are equal, round, and reactive to  light.   Neck: Trachea normal and phonation normal. Neck supple.   Cardiovascular: Normal rate, regular rhythm, normal heart sounds and normal pulses.   Pulmonary/Chest: Effort normal and breath sounds normal. No stridor. No respiratory distress. She has no wheezes. She has no rhonchi. She has no rales.   Musculoskeletal:         General: Swelling, tenderness and signs of injury present.      Right wrist: Normal. She exhibits normal range of motion.      Left wrist: Normal. She exhibits normal range of motion.      Right hand: She exhibits decreased range of motion (due to swelling and pain), tenderness and swelling. She exhibits normal capillary refill and no laceration. Normal strength noted.      Left hand: Normal.        Hands:    Neurological: She is alert and oriented to person, place, and time. She displays no weakness. Gait normal.   Skin: Skin is warm, dry, intact, not diaphoretic and no rash. Capillary refill takes less than 2 seconds. No abrasion, No burn, No bruising, No erythema and No ecchymosis   Psychiatric: Her speech is normal and behavior is normal. Mood, judgment and thought content normal.   Nursing note and vitals reviewed.      Assessment:     1. Closed nondisplaced fracture of base of third metacarpal bone of right hand, initial encounter    2. Hand injury, right, initial encounter        XR HAND COMPLETE 3 VIEW RIGHT    Result Date: 12/16/2023  EXAM: XR HAND COMPLETE 3 VIEW RIGHT CLINICAL HISTORY: Trauma, right hand pain TECHNIQUE: 3 view right hand series FINDINGS: Suspect nondisplaced fracture involving the base of the third metacarpal.      See above. Finalized on: 12/16/2023 12:02 PM By:  Acosta Farley MD [facility]# [id number]      2023-12-16 12:04:30.322    [facility]     Plan:       Closed nondisplaced fracture of base of third metacarpal bone of right hand, initial encounter  -     THUMB ORTHOSIS SPLINT UNIVERSAL FOR HOME USE    Hand injury, right, initial encounter  -     XR HAND COMPLETE 3 VIEW  RIGHT; Future; Expected date: 12/16/2023  -     Ambulatory referral/consult to Orthopedics        Patient Instructions   R.I.C.E:    Rest, apply ice intermittently, compress with splint, and elevate above heart level as much as possible.  Do not apply ice directly to skin. Wrap ice in cloth before applying.    OTC Tylenol (acetaminophen) up to 4,000 mg a day is safe for short periods and can be used for pain, and fever. However in high doses and prolonged use it can cause liver irritation.    OTC Ibuprofen (NSAID) is a non-steroidal anti-inflammatory that can be used for pain. However it can also cause stomach irritation if over used.    Of note: Aleve, Motrin, Advil, Mobic, meloxicam, and indomethacin are also other names of NSAIDs.    You will need to follow-up with orthopedics if your symptoms persist, as we discussed.    AMBULATORY REFERRAL ORDERED:   Someone will call with an appointment this week.  Please answer all unknown calls.  If you are not contacted within 7 days, Please call clinic  for status of appointment. ([phone])

## 2023-12-18 ENCOUNTER — TELEPHONE (OUTPATIENT)
Dept: ORTHOPEDICS | Facility: CLINIC | Age: 28
End: 2023-12-18
Payer: COMMERCIAL

## 2023-12-20 ENCOUNTER — OFFICE VISIT (OUTPATIENT)
Dept: ORTHOPEDICS | Facility: CLINIC | Age: 28
End: 2023-12-20
Payer: COMMERCIAL

## 2023-12-20 VITALS
HEART RATE: 68 BPM | HEIGHT: 72 IN | TEMPERATURE: 98 F | DIASTOLIC BLOOD PRESSURE: 82 MMHG | SYSTOLIC BLOOD PRESSURE: 132 MMHG | WEIGHT: 194.69 LBS | BODY MASS INDEX: 26.37 KG/M2

## 2023-12-20 DIAGNOSIS — S62.342A NONDISPLACED FRACTURE OF BASE OF THIRD METACARPAL BONE, RIGHT HAND, INITIAL ENCOUNTER FOR CLOSED FRACTURE: ICD-10-CM

## 2023-12-20 DIAGNOSIS — S62.340A CLOSED NONDISPLACED FRACTURE OF BASE OF SECOND METACARPAL BONE OF RIGHT HAND, INITIAL ENCOUNTER: ICD-10-CM

## 2023-12-20 DIAGNOSIS — M79.641 RIGHT HAND PAIN: Primary | ICD-10-CM

## 2023-12-20 PROCEDURE — 99999 PR PBB SHADOW E&M-EST. PATIENT-LVL III: ICD-10-PCS | Mod: PBBFAC,,, | Performed by: ORTHOPAEDIC SURGERY

## 2023-12-20 PROCEDURE — 99999 PR PBB SHADOW E&M-EST. PATIENT-LVL III: CPT | Mod: PBBFAC,,, | Performed by: ORTHOPAEDIC SURGERY

## 2023-12-20 PROCEDURE — 99204 PR OFFICE/OUTPT VISIT, NEW, LEVL IV, 45-59 MIN: ICD-10-PCS | Mod: S$GLB,,, | Performed by: ORTHOPAEDIC SURGERY

## 2023-12-20 PROCEDURE — 99204 OFFICE O/P NEW MOD 45 MIN: CPT | Mod: S$GLB,,, | Performed by: ORTHOPAEDIC SURGERY

## 2023-12-20 NOTE — PATIENT INSTRUCTIONS
Base of second and third metacarpals are broken.    Wear the brace at all times including when sleeping, except for hand hygiene.       If she has significant nosebleed, have her hold her aspirin and call us with the progress next week

## 2023-12-24 NOTE — PROGRESS NOTES
DATE OF INJURY   (12/15/2023)    CC     Right hand injury       HPI     Rebeca Ortega is a 27 y.o. right hand dominant  I am asked to see regarding her right hand injury.    She reports she was on her friend's problem horse who reared up and fell sideways.  Rebeca reports she was able to get her feet free of the stirrups but could not get her hand clear.  Her right hand got trapped between the horse and the sand of the ring.      She's been wearing the splint given to her by the ER but it only helps some.  The hand is swollen and bruised.  The pain is centered at the base of the metacarpals.    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        PAST MEDICAL HISTORY   History reviewed. No pertinent past medical history.       PAST SURGICAL HISTORY  History reviewed. No pertinent surgical history.         ALLERGIES       Review of patient's allergies indicates:  No Known Allergies       MEDICATIONS        Current Outpatient Medications:     dextroamphetamine-amphetamine 10 mg Tab, Take 1 tablet (10 mg total) by mouth daily with lunch., Disp: 30 tablet, Rfl: 0    FLUoxetine 20 MG capsule, Take 1 capsule (20 mg total) by mouth once daily., Disp: 90 capsule, Rfl: 3    lisdexamfetamine (VYVANSE) 50 MG capsule, Take 1 capsule (50 mg total) by mouth every morning., Disp: 30 capsule, Rfl: 0    ondansetron (ZOFRAN-ODT) 4 MG TbDL, Take 1 tablet (4 mg total) by mouth every 6 (six) hours as needed (nausea)., Disp: 30 tablet, Rfl: 0    iron-FA-dha-epa-FAD-NADH-be-mv (ENLYTE) 1.5 mg iron- 8.73 mg CpID, Take 1 tablet by mouth daily (Patient not taking: Reported on 12/16/2023), Disp: 100 each, Rfl: 3         SOCIAL HISTORY        Social History     Occupational History    Not on file   Tobacco Use    Smoking status: Never    Smokeless tobacco: Never   Substance and Sexual Activity    Alcohol use: Never    Drug use: Not on file    Sexual activity: Yes     Birth control/protection:  Condom, Other-see comments     Comment: Birth control pill            OCCUPATIONAL HISTORY 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            FAMILY HISTORY   History reviewed. No pertinent family history.       REVIEW OF SYSTEMS:      GEN:   Denies fever, chills, malaise, unexpected weight changes.    HEENT: Denies headaches, diplopia, rhinnorea, epistaxis,          difficulty swallowing.    CV:    Denies chest pain, palpitations  ENDO:  Denies diabetes, temperature intolerance  GI:    Denies reflux, nausea, vomiting, constipation, diarrhea.       Denies dysuria/nocturia   NEURO: Denies  seizures, paralysis/paresis or        neuropathy.  PULM:  Denies shortness of breath, wheezing, cough, hemoptysis,   MSK:   See HPI. Denies arthritis, myalgias, previous fracture.     PHYSICAL EXAMINATION  Vitals:    12/20/23 1134   BP: 132/82   Pulse: 68   Temp: 98 °F (36.7 °C)   Weight: 88.3 kg (194 lb 10.7 oz)   Height: 6' (1.829 m)         GEN            NAD, well appearing     PSYCH          A&Ox3, pleasant and cooperative    Right UE:    Skin intact.  Positive ecchymoses.  Positive swelling.  Thenar/hypthenar eminences soft.  Positive tenderness at base of 2nd and 3rd metacarpals.  Much less at base of other metacarpals.  Full pronosupination of the hand/wrist.  Good wrist flexion/extension.  Lacks several centimeters of fingertip to hand.  FDS/FDP intact on all digits.  Sensation Intact radial/ulnar/median nerves.  Radial/ulnar/median nerves intact to motor.  Cap refill <2s.        DIAGNOSTIC IMAGING  Right Hand XR: 3 Views personally reviewed.  Essentially non-displaced fractures of the base of the 2nd/3rd metacarpals - extra-articular.  No other fracture noted.        DISCUSSION  Diagnostic imaging, clinical findings, and patient's symptoms reviewed  and correlated.  Discussed non-operative treatments including splinting/icing/elevating.  Discussed I see no need for surgical  intervention.    Patient given an opportunity to ask questions.  When her questions were answered, she agreed with the plan noted below.       DIAGNOSIS:  1. Right hand pain  X-Ray Hand Complete Right      2. Closed nondisplaced fracture of base of second metacarpal bone of right hand, initial encounter        3. Nondisplaced fracture of base of third metacarpal bone, right hand, initial encounter for closed fracture                 PLAN:  Volar fiberglass splint blocking MPJ motion fashioned   Splint only to be removed for hand hygiene.  Ice to affected area PRN pain/swelling  Follow-up in 1 week(s) with XR

## 2023-12-28 ENCOUNTER — HOSPITAL ENCOUNTER (OUTPATIENT)
Dept: RADIOLOGY | Facility: HOSPITAL | Age: 28
Discharge: HOME | End: 2023-12-28
Attending: ORTHOPAEDIC SURGERY
Payer: COMMERCIAL

## 2023-12-28 DIAGNOSIS — M79.641 RIGHT HAND PAIN: ICD-10-CM

## 2023-12-28 PROCEDURE — 73130 X-RAY EXAM OF HAND: CPT | Mod: 26,RT,, | Performed by: RADIOLOGY

## 2023-12-28 PROCEDURE — 73130 X-RAY EXAM OF HAND: CPT | Mod: TC,RT

## 2023-12-28 PROCEDURE — 73130 XR HAND COMPLETE 3 VIEW RIGHT: ICD-10-PCS | Mod: 26,RT,, | Performed by: RADIOLOGY

## 2024-01-05 RX ORDER — LISDEXAMFETAMINE DIMESYLATE CAPSULES 50 MG/1
50 CAPSULE ORAL EVERY MORNING
Qty: 30 CAPSULE | Refills: 0 | Status: SHIPPED | OUTPATIENT
Start: 2024-01-05

## 2024-01-05 NOTE — TELEPHONE ENCOUNTER
No care due was identified.  [facility] Embedded Care Due Messages. Reference number: [id number].   1/05/2024 11:02:52 AM CST

## 2024-01-29 ENCOUNTER — OFFICE VISIT (OUTPATIENT)
Dept: PRIMARY CARE CLINIC | Facility: CLINIC | Age: 29
End: 2024-01-29
Payer: COMMERCIAL

## 2024-01-29 DIAGNOSIS — F90.9 ATTENTION DEFICIT HYPERACTIVITY DISORDER (ADHD), UNSPECIFIED ADHD TYPE: Primary | ICD-10-CM

## 2024-01-29 DIAGNOSIS — F41.1 GENERALIZED ANXIETY DISORDER: ICD-10-CM

## 2024-01-29 PROCEDURE — 99214 OFFICE O/P EST MOD 30 MIN: CPT | Mod: 95,,, | Performed by: FAMILY MEDICINE

## 2024-01-29 RX ORDER — DEXTROAMPHETAMINE SACCHARATE, AMPHETAMINE ASPARTATE MONOHYDRATE, DEXTROAMPHETAMINE SULFATE, AMPHETAMINE SULFATE 9.375; 9.375; 9.375; 9.375 MG/1; MG/1; MG/1; MG/1
1 CAPSULE, EXTENDED RELEASE ORAL DAILY
Qty: 30 EACH | Refills: 0 | Status: SHIPPED | OUTPATIENT
Start: 2024-01-29 | End: 2024-01-29

## 2024-01-29 NOTE — PROGRESS NOTES
Subjective:      Patient ID: Rebeca Ortega is a 28 y.o. female.    Chief Complaint: Follow-up (3 month follow up ADHD)    The patient location is: home  Visit type: video and audio simultaneous    Patient is a 28 y.o. female coming in today for ADHD f/u.  Reports having issues refilling Vyvanse in local pharmacies. Pt used to be on Concerta and Adderall; pt prefers Vyvanse for ADHD treatment. Discussed reaching out to pharmacies to determine any options for XR medication available. Anxiety and depression are stable with medication. No other health concern at this time.       [facility] Reason For Visit    1/29/2024  1:48 PM CST - Filed by Patient   What is your primary reason for visit? Other/Annual   Have you experienced any of the following:   Change in activity? No   Unexpected weight change? No   Neck pain? No   Hearing loss? No   Runny nose? No   Trouble swallowing? No   Eye discharge? No   Changes in vision? No   Chest tightness? No   Wheezing? No   Chest pain? No   Heart beating fast or racing? No   Blood in stool? No   Constipation? No   Vomiting? No   Diarrhea? No   Drinking much more than usual? No   Urinating much more than usual? No   Difficulty urinating? No   Blood in the urine? No   Menstrual problem? No   Painful urination? No   Joint swelling? No   Joint pain? No   Headaches? No   Weakness? No   Confusion? No   Feeling depressed? No         Pmh, Psh, Family Hx, Social Hx updated in Epic Tabs today.     LABS:   Lab Results   Component Value Date    WBC 4.68 07/17/2023    HGB 14.4 07/17/2023    HCT 42.9 07/17/2023     07/17/2023    CHOL 156 07/17/2023    TRIG 48 07/17/2023    HDL 54 07/17/2023    ALT 13 07/17/2023    AST 17 07/17/2023     07/17/2023    K 4.9 07/17/2023     (H) 07/17/2023    CREATININE 0.8 07/17/2023    BUN 10 07/17/2023    CO2 19 (L) 07/17/2023    TSH 1.978 07/17/2023    HGBA1C 4.7 11/16/2021       X-Ray Hand Complete Right  Narrative: EXAM:   XR HAND COMPLETE 3 VIEW  RIGHT    CLINICAL HISTORY: Right hand pain.    FINDINGS: Three views of the hand.  Comparison 12/16/2023    Cast material limits fine bony detail.     Normal bone density and architecture.  Again there is a nondisplaced fracture seen at the base of the third and likely fourth metacarpal bones.  Mild soft tissue swelling,  Impression:  See above    Finalized on: 12/28/2023 9:12 AM By:  Jorge Ly MD  [facility]# [id number]      2023-12-28 09:14:20.151    [facility]        Review of Systems   Constitutional:  Negative for activity change and unexpected weight change.   HENT:  Negative for hearing loss, rhinorrhea and trouble swallowing.    Eyes:  Negative for discharge and visual disturbance.   Respiratory:  Negative for chest tightness and wheezing.    Cardiovascular:  Negative for chest pain and palpitations.   Gastrointestinal:  Negative for blood in stool, constipation, diarrhea and vomiting.   Endocrine: Negative for polydipsia and polyuria.   Genitourinary:  Negative for difficulty urinating, dysuria, hematuria and menstrual problem.   Musculoskeletal:  Negative for arthralgias, joint swelling and neck pain.   Neurological:  Negative for weakness and headaches.   Psychiatric/Behavioral:  Negative for confusion and dysphoric mood.      Objective:   There were no vitals filed for this visit.  Wt Readings from Last 10 Encounters:   12/20/23 88.3 kg (194 lb 10.7 oz)   12/16/23 88.3 kg (194 lb 8.9 oz)   07/25/23 87 kg (191 lb 12.8 oz)   05/16/23 85.5 kg (188 lb 7.9 oz)   02/28/23 82.6 kg (182 lb)   12/22/22 83 kg (182 lb 15.7 oz)   10/26/22 78.4 kg (172 lb 11.7 oz)   08/26/22 82.2 kg (181 lb 3.5 oz)   05/11/22 81 kg (178 lb 7.4 oz)   11/16/21 78.6 kg (173 lb 4.5 oz)     Physical Exam  Vitals reviewed.   Constitutional:       General: She is awake. She is not in acute distress.     Appearance: Normal appearance. She is well-developed, well-groomed and normal weight. She is not ill-appearing.   HENT:      Head: Normocephalic and  atraumatic.      Right Ear: External ear normal.      Left Ear: External ear normal.      Nose: Nose normal.      Mouth/Throat:      Lips: Pink.   Eyes:      Conjunctiva/sclera: Conjunctivae normal.   Pulmonary:      Effort: Pulmonary effort is normal.   Neurological:      Mental Status: She is alert.   Psychiatric:         Attention and Perception: Attention and perception normal. She is attentive.         Mood and Affect: Mood and affect normal. Mood is not anxious or depressed. Affect is not labile, blunt, angry or inappropriate.         Speech: Speech normal. She is communicative. Speech is not rapid and pressured, delayed, slurred or tangential.         Behavior: Behavior normal. Behavior is not agitated, slowed, aggressive, withdrawn, hyperactive or combative. Behavior is cooperative.         Thought Content: Thought content normal. Thought content is not paranoid or delusional. Thought content does not include homicidal or suicidal ideation. Thought content does not include homicidal or suicidal plan.         Cognition and Memory: Cognition and memory normal. Memory is not impaired. She does not exhibit impaired recent memory or impaired remote memory.         Judgment: Judgment normal. Judgment is not impulsive or inappropriate.       Assessment:     1. Attention deficit hyperactivity disorder (ADHD), unspecified ADHD type    2. Generalized anxiety disorder      Plan:   Rebeca was seen today for follow-up.    Diagnoses and all orders for this visit:    Attention deficit hyperactivity disorder (ADHD), unspecified ADHD type  Comments:  cont with vyvanse 50mg.  reviewed. q 3 mo visits. can use mydayis generic 37.5mg if vyvnase not available in future.  Orders:  -     Discontinue: dextroamphetamine-amphetamine (MYDAYIS) 37.5 mg CT24; Take 1 capsule by mouth Daily.    Generalized anxiety disorder  Comments:  stable with prozac 20mg. cont wiht meds.      ADHD is - stable, Continue with current medications and  interventions.  reviewed.   Refilled Rx Vyvanse 50 mg/day for ADHD treatment.   Pt can use Mydayis 37.5 mg/day for ADHD treatment if Vyvanse is not available in the future.   Anxiety is controlled with medication; continue as prescribed.   Instructed to f/u in 3 months with DEYANIRA via telemedicine for ADHD f/u.     Face to Face time with patient: 1:56 PM-2:04 PM          25 minutes of total time spent on the encounter, which includes face to face time and non-face to face time preparing to see the patient (eg, review of tests), Obtaining and/or reviewing separately obtained history, Documenting clinical information in the electronic or other health record, Independently interpreting results (not separately reported) and communicating results to the patient/family/caregiver, or Care coordination (not separately reported).     Each patient to whom he or she provides medical services by telemedicine is:  (1) informed of the relationship between the physician and patient and the respective role of any other health care provider with respect to management of the patient; and (2) notified that he or she may decline to receive medical services by telemedicine and may withdraw from such care at any time.      Follow up in about 3 months (around 4/29/2024) for f/u DEYANIRA Akhil Gunter NP, f/u Virtual Visit.    There are no Patient Instructions on file for this visit.    Scribe Attestation:   I, Jonnathan Anderson, am scribing for, and in the presence of, Dr. Kayley Ortez MD. I performed the above scribed service and the documentation accurately describes the services I performed. I attest to the accuracy of the note.    I, Dr. Kayley Ortez MD, reviewed documentation as scribed above. I personally performed the services described in this documentation.  I agree that the record reflects my personal performance and is accurate and complete. Kayley Ortez MD.    01/29/2024

## 2024-03-03 NOTE — TELEPHONE ENCOUNTER
No care due was identified.  [facility] Embedded Care Due Messages. Reference number: [id number].   3/03/2024 8:47:17 AM CST

## 2024-03-04 RX ORDER — LISDEXAMFETAMINE DIMESYLATE 50 MG/1
50 CAPSULE ORAL EVERY MORNING
Qty: 30 CAPSULE | Refills: 0 | Status: SHIPPED | OUTPATIENT
Start: 2024-03-04 | End: 2024-05-20 | Stop reason: SDUPTHER

## 2024-04-08 ENCOUNTER — PATIENT MESSAGE (OUTPATIENT)
Dept: PRIMARY CARE CLINIC | Facility: CLINIC | Age: 29
End: 2024-04-08
Payer: COMMERCIAL

## 2024-04-19 ENCOUNTER — PATIENT MESSAGE (OUTPATIENT)
Dept: RESEARCH | Facility: HOSPITAL | Age: 29
End: 2024-04-19
Payer: COMMERCIAL

## 2024-05-12 NOTE — TELEPHONE ENCOUNTER
No care due was identified.  [facility] Embedded Care Due Messages. Reference number: [id number].   5/12/2024 1:03:30 PM CDT

## 2024-05-13 RX ORDER — LISDEXAMFETAMINE DIMESYLATE 50 MG/1
50 CAPSULE ORAL EVERY MORNING
Qty: 30 CAPSULE | Refills: 0 | OUTPATIENT
Start: 2024-05-13

## 2024-05-14 RX ORDER — LISDEXAMFETAMINE DIMESYLATE 50 MG/1
50 CAPSULE ORAL EVERY MORNING
Qty: 30 CAPSULE | Refills: 0 | OUTPATIENT
Start: 2024-05-14

## 2024-05-14 NOTE — TELEPHONE ENCOUNTER
No care due was identified.  [facility] Embedded Care Due Messages. Reference number: [id number].   5/14/2024 4:05:14 AM CDT

## 2024-05-20 RX ORDER — LISDEXAMFETAMINE DIMESYLATE 50 MG/1
50 CAPSULE ORAL EVERY MORNING
Qty: 30 CAPSULE | Refills: 0 | Status: SHIPPED | OUTPATIENT
Start: 2024-05-20

## 2024-05-20 NOTE — TELEPHONE ENCOUNTER
No care due was identified.  [facility] Embedded Care Due Messages. Reference number: [id number].   5/20/2024 3:57:21 PM CDT

## 2024-07-03 RX ORDER — LISDEXAMFETAMINE DIMESYLATE 50 MG/1
50 CAPSULE ORAL EVERY MORNING
Qty: 30 CAPSULE | Refills: 0 | OUTPATIENT
Start: 2024-07-03

## 2024-07-03 NOTE — TELEPHONE ENCOUNTER
Patient is an office visit for refilling this medication. Please contact patient to schedule virtual visit or office visit before filling medication. Thanks. Kayley Ortez MD

## 2024-07-03 NOTE — TELEPHONE ENCOUNTER
No care due was identified.  [facility] Embedded Care Due Messages. Reference number: [id number].   7/02/2024 10:26:47 PM CDT

## 2024-07-18 ENCOUNTER — TELEPHONE (OUTPATIENT)
Dept: PRIMARY CARE CLINIC | Facility: CLINIC | Age: 29
End: 2024-07-18

## 2024-07-18 NOTE — TELEPHONE ENCOUNTER
----- Message from Akhil Gunter NP sent at 7/18/2024  3:24 PM CDT -----  Team,    1. Notify her that Dr. Ortez is out and I do not engage in E-Visits.  2. Get her scheduled for a virtual visit is she chooses.    Sincerely,  Akhil Gunter NP

## 2024-07-18 NOTE — TELEPHONE ENCOUNTER
Informed Mr. Gunter does not do evisit but can schedule a virtual. Patient declined virtual at this time.

## 2024-07-23 ENCOUNTER — TELEPHONE (OUTPATIENT)
Dept: PRIMARY CARE CLINIC | Facility: CLINIC | Age: 29
End: 2024-07-23

## 2024-07-23 ENCOUNTER — OFFICE VISIT (OUTPATIENT)
Dept: PRIMARY CARE CLINIC | Facility: CLINIC | Age: 29
End: 2024-07-23
Payer: COMMERCIAL

## 2024-07-23 DIAGNOSIS — F90.9 ATTENTION DEFICIT HYPERACTIVITY DISORDER (ADHD), UNSPECIFIED ADHD TYPE: Primary | ICD-10-CM

## 2024-07-23 DIAGNOSIS — N92.0 MENORRHAGIA WITH REGULAR CYCLE: ICD-10-CM

## 2024-07-23 DIAGNOSIS — F41.1 GENERALIZED ANXIETY DISORDER: ICD-10-CM

## 2024-07-23 PROCEDURE — 99214 OFFICE O/P EST MOD 30 MIN: CPT | Mod: 95,,, | Performed by: NURSE PRACTITIONER

## 2024-07-23 PROCEDURE — G2211 COMPLEX E/M VISIT ADD ON: HCPCS | Mod: 95,,, | Performed by: NURSE PRACTITIONER

## 2024-07-23 RX ORDER — FLUOXETINE HYDROCHLORIDE 20 MG/1
20 CAPSULE ORAL DAILY
Qty: 90 CAPSULE | Refills: 3 | Status: SHIPPED | OUTPATIENT
Start: 2024-07-23

## 2024-07-23 RX ORDER — LISDEXAMFETAMINE DIMESYLATE 50 MG/1
50 CAPSULE ORAL EVERY MORNING
Qty: 30 CAPSULE | Refills: 0 | Status: SHIPPED | OUTPATIENT
Start: 2024-07-23

## 2024-07-23 NOTE — PROGRESS NOTES
Assessment & Plan  Problem List Items Addressed This Visit          Psychiatric    Attention deficit hyperactivity disorder - Primary  -The current medical regimen is effective;  continue present plan and medications.      Relevant Medications    lisdexamfetamine (VYVANSE) 50 MG capsule    Other Relevant Orders    TSH    Lipid Panel    Comprehensive Metabolic Panel    CBC Auto Differential    Generalized anxiety disorder  -The current medical regimen is effective;  continue present plan and medications.      Relevant Medications    FLUoxetine 20 MG capsule    lisdexamfetamine (VYVANSE) 50 MG capsule    Other Relevant Orders    TSH    Lipid Panel    Comprehensive Metabolic Panel    CBC Auto Differential       Renal/    Menorrhagia  -The current medical regimen is effective;  continue present plan and medications.           Health Maintenance reviewed: Deferred at this time.    The patient location is:  Patient appears to be parked in a vehicle  The chief complaint leading to consultation is: noted below  Visit type: Virtual visit with synchronous audio and video  Total time spent with patient: 20+ minutes  Each patient to whom he or she provides medical services by telemedicine is:  (1) informed of the relationship between the physician and patient and the respective role of any other health care provider with respect to management of the patient; and (2) notified that he or she may decline to receive medical services by telemedicine and may withdraw from such care at any time.    Follow-up: 1. Non-labs at [place] location  2. Schedule in person appt with Dr. Ortez in 3 months.  3. Patient understands that she requires an in person- routine physical exam at least once a year.  4. I explained she will not receive any additional refills if she does report for scheduled appts.   5. I explained that adding adjunct Adderall will be dicussed at upcoming Routine Physical appt.    Sincerely,  Akhil Gunter NP       Chief  Complaint  No chief complaint on file.      HPI  Rebeca Ortega is a 28 y.o. female with multiple medical diagnoses as listed in the medical history and problem list that presents for ADHD/NANDA via virtual visit. This patient is new to me.     ADHD/NANDA: Stable Vyvanse 50 mg however, some days when work is more intense or if she has more tasks to complete attention and concentration deficit ensures. Was able to use low dose Adderall for break through symptoms in the past. No blood pressure to report at this time. Agrees to send an updated blood pressure via [employer] for chart update.         ROS  Review of Systems   Constitutional:  Negative for activity change and unexpected weight change.   HENT:  Negative for hearing loss, rhinorrhea and trouble swallowing.    Eyes:  Negative for discharge and visual disturbance.   Respiratory:  Negative for chest tightness and wheezing.    Cardiovascular:  Negative for chest pain and palpitations.   Gastrointestinal:  Negative for blood in stool, constipation, diarrhea and vomiting.   Endocrine: Negative for polydipsia and polyuria.   Genitourinary:  Negative for difficulty urinating, dysuria, hematuria and menstrual problem.   Musculoskeletal:  Negative for arthralgias, joint swelling and neck pain.   Neurological:  Negative for weakness and headaches.   Psychiatric/Behavioral:  Negative for confusion and dysphoric mood.            Physical Exam  Physical Exam  Constitutional:       Appearance: Normal appearance.   HENT:      Head: Normocephalic and atraumatic.   Pulmonary:      Effort: Pulmonary effort is normal.   Neurological:      Mental Status: She is alert and oriented to person, place, and time.

## 2024-07-26 ENCOUNTER — OFFICE VISIT (OUTPATIENT)
Dept: URGENT CARE | Facility: CLINIC | Age: 29
End: 2024-07-26
Payer: COMMERCIAL

## 2024-07-26 VITALS
TEMPERATURE: 99 F | SYSTOLIC BLOOD PRESSURE: 129 MMHG | HEART RATE: 89 BPM | RESPIRATION RATE: 18 BRPM | OXYGEN SATURATION: 98 % | BODY MASS INDEX: 26.28 KG/M2 | HEIGHT: 72 IN | WEIGHT: 194 LBS | DIASTOLIC BLOOD PRESSURE: 88 MMHG

## 2024-07-26 DIAGNOSIS — U07.1 COVID-19: Primary | ICD-10-CM

## 2024-07-26 DIAGNOSIS — R50.9 SUBJECTIVE FEVER: ICD-10-CM

## 2024-07-26 DIAGNOSIS — R68.89 FLU-LIKE SYMPTOMS: ICD-10-CM

## 2024-07-26 LAB
CTP QC/QA: YES
SARS-COV-2 AG RESP QL IA.RAPID: POSITIVE

## 2024-07-26 NOTE — PATIENT INSTRUCTIONS
You have tested positive for COVID-19 today.      ISOLATION  If you tested positive and do not have symptoms, you must isolate for 5 days starting on the day of the positive test. I    If you tested positive and have symptoms, you must isolate for 5 days starting on the day of the first symptoms,  not the day of the positive test.     This is the most important part, both the CDC and the [facility] emphasize that you do not test out of isolation.     After 5 days, if your symptoms have improved and you have not had fever on day 5, you can return to the community on day 6- NO TESTING REQUIRED!      In fact, we do not retest if you were positive in the last 90 days.    After your 5 days of isolation are completed, the CDC recommends strict mask use for the first 5 days that you come out of isolation. PLEASE FOLLOW CDC GUIDELINES REGARDING TRAVEL AFTER COVID INFECTION.    Return to Urgent Care or go to ER if symptoms worsen or fail to improve.  Follow up with PCP as recommended for further management.     Your symptoms should begin to improve by Day 5 of the infection-- if symptoms worsen, you develop a new fever, shortness of breath, worsening shortness of breath with activity, chest pain, worsening cough, you must return to Urgent Care or go to the ER.    PLEASE READ YOUR DISCHARGE INSTRUCTIONS ENTIRELY AS IT CONTAINS IMPORTANT INFORMATION.      Please drink plenty of fluids.    Please get plenty of rest.    Please return here or go to the Emergency Department for any concerns or worsening of condition.      Tylenol or ibuprofen can also be used as directed for pain and fever unless you have an allergy to them or medical condition such as stomach ulcers, kidney or liver disease or blood thinners etc for which you should not be taking these type of medications.   YOU CAN ALTERNATE TYLENOL AND IBUPROFEN EVERY 3-4 HOURS. Take 1000mg (2 pills) of Extra Strength Acetaminophen (Tylenol) every 6 hours and 600mg (3 pills) of  Ibuprofen (Motrin/Advil) every 6 hours, alternating the two so that every 3 hours you take one or the other. Start with the Tylenol, then 3 hours later take the Ibuprofen, then 3 hours later take the Tylenol again, and so on.         For your allergy symptoms and/or runny nose, sinus/ear pressure, congestion:        - You can take over-the-counter claritin, zyrtec, allegra, or xyzal as directed. These are antihistamines that can help with runny nose, nasal congestion, sneezing, and helps to dry up post-nasal drip, which usually causes sore throat and cough.               - If you do NOT have high blood pressure, you may use a decongestant form (D)  of this medication (ie. Claritin- D, zyrtec-D, allegra-D) or if you do not take the D form, you can take sudafed (pseudoephedrine) over the counter, which is a decongestant. Do NOT take two decongestant (D) medications at the same time (such as mucinex-D and claritin-D or plain sudafed and claritin D). Dextromethorphan (DM) is a cough suppressant over the counter (ie. mucinex DM, robitussin, delsym; dayquil/nyquil has DM as well.)    -If you DO have high blood pressure, AVOID DECONGESTANTS (I.e., Phenylephrine and Pseudoephedrine). You may take Coricidin HBP for sinus congestion and Delsym for cough.        - You can take plain Mucinex (guaifenesin) 1200 mg twice a day to help loosen mucous.       Use over the counter flonase or nasocort: one spray each nostril twice daily OR two sprays each nostril once daily until nares dry out, unless you have Glaucoma.   If you find this dries your nose out or your nose bleeds, try using over the counter nasal saline a few minutes prior to using the flonase to moisten the lining of your nose and throughout the day as needed.   Flonase/nasal spray use directions:  1) Use once per day.  2) Blow nose first.  3) Close one nostril and spray flonase up the other nostril while inhaling gently.   4) If you inhale to aggressively, you will  have a bitter taste in the back of your mouth.       You can try breathe right strips at night to help you breathe.  A cool mist humidifier in bedroom may help with cough and relieve stuffy nose.     Sinus rinses DO NOT USE TAP WATER, if you must, water must be a rolling boil for 1 minute, let it cool, then use.  May use distilled water, or over the counter nasal saline rinses.  Vics vapor rub in shower to help open nasal passages.  May use nasal gel to keep passages moisturized.  May use Nasal saline sprays during the day for added relief of congestion.   For those who go to the gym, please do not use the sauna or steam room now to clear sinuses.      Sore throat recommendations: Warm fluids, warm salt water gargles, throat lozenges, tea, honey, soup, rest, hydration.      Cough     Honey with cindy to soothe your throat    Robitussin or Delsyum for cough suppressant for dry cough.    Mucinex DM or products containing Guaifenesin or Dextromethorphan for expectorant (wet cough).    Please follow up with your primary care doctor or specialist in the next 48-72hrs as needed and if no improvement    If you  smoke, please stop smoking.    Please return or see your primary care doctor if you develop new or worsening symptoms.     Please arrange follow up with your primary medical clinic as soon as possible. You must understand that you've received an Urgent Care treatment only and that you may be released before all of your medical problems are known or treated. You, the patient, will arrange for follow up as instructed. If your symptoms worsen or fail to improve you should go to the Emergency Room.

## 2024-07-26 NOTE — LETTER
"    [id number] , [address]  [facility], [place] [address]  Phone: [phone]  Fax: [phone]      Return to School    Rebeca Ortega (Errin) was seen and treated in our clinic on 7/26/2024.     COVID-19 is present in our communities across the [facility]. There is limited testing for COVID at this time, so not all patients can be tested. In this situation, your employee meets the following criteria:    Rebeca Ortega has met the criteria for COVID-19 testing and has a POSITIVE result. She can return to school once they are asymptomatic for 24 hours without the use of fever reducing medications AND at least five days from the first positive result. A mask is recommended for 5 days post quarantine.     If you have any questions or concerns, or if I can be of further assistance, please do not hesitate to contact me.    Sincerely,             Lesly Leung PA-C  "

## 2024-07-26 NOTE — PROGRESS NOTES
Subjective:      Patient ID: Rebeca Ortega is a 28 y.o. female.    Vitals:  height is 6' (1.829 m) and weight is 88 kg (194 lb). Her oral temperature is 98.7 °F (37.1 °C). Her blood pressure is 129/88 and her pulse is 89. Her respiration is 18 and oxygen saturation is 98%.     Chief Complaint: Sinus Problem    27 yo female presents to the clinic with c/o fever, chills, body aches starting yesterday. States people at work sick. States she took tylenol and seems to be helping symptoms significantly. Requesting work excuse. Denies cp, sob, sore throat, ear pain, cough, congestion. NKDA.     Sinus Problem  The current episode started yesterday. The maximum temperature recorded prior to her arrival was 101 - 101.9 F. Her pain is at a severity of 0/10. Associated symptoms include chills and swollen glands. Pertinent negatives include no congestion, coughing, diaphoresis, ear pain, headaches, neck pain, shortness of breath, sinus pressure, sneezing or sore throat. Past treatments include acetaminophen (mucinex DM). The treatment provided moderate relief.       Constitution: Positive for chills and fever. Negative for sweating.   HENT:  Negative for ear pain, ear discharge, congestion, sinus pressure, sore throat, trouble swallowing and voice change.    Neck: Negative for neck pain and neck stiffness.   Cardiovascular:  Negative for chest pain.   Respiratory:  Negative for cough and shortness of breath.    Musculoskeletal:  Positive for muscle ache (generalized).   Skin:  Negative for rash.   Allergic/Immunologic: Negative for sneezing.   Neurological:  Negative for headaches.      Objective:     Vitals:    07/26/24 1201   BP: 129/88   Pulse: 89   Resp: 18   Temp: 98.7 °F (37.1 °C)       Physical Exam   Constitutional: She is oriented to person, place, and time. She appears well-developed. She is cooperative.  Non-toxic appearance. She does not appear ill. No distress.   HENT:   Head: Normocephalic and atraumatic.   Ears:    Right Ear: Hearing, tympanic membrane, external ear and ear canal normal. Tympanic membrane is not erythematous and not bulging. no impacted cerumen  Left Ear: Hearing, tympanic membrane, external ear and ear canal normal. Tympanic membrane is not erythematous and not bulging. no impacted cerumen  Nose: Nose normal. No mucosal edema, rhinorrhea or nasal deformity. No epistaxis. Right sinus exhibits no maxillary sinus tenderness and no frontal sinus tenderness. Left sinus exhibits no maxillary sinus tenderness and no frontal sinus tenderness.   Mouth/Throat: Uvula is midline, oropharynx is clear and moist and mucous membranes are normal. Mucous membranes are moist. No trismus in the jaw. Normal dentition. No uvula swelling. No oropharyngeal exudate, posterior oropharyngeal edema, posterior oropharyngeal erythema or cobblestoning.   Eyes: Conjunctivae and lids are normal. Pupils are equal, round, and reactive to light. Right eye exhibits no discharge. Left eye exhibits no discharge. No scleral icterus.   Neck: Trachea normal and phonation normal. Neck supple. No edema present. No erythema present. No neck rigidity present.   Cardiovascular: Normal rate, regular rhythm, normal heart sounds and normal pulses.   Pulmonary/Chest: Effort normal and breath sounds normal. No accessory muscle usage or stridor. No respiratory distress. She has no decreased breath sounds. She has no wheezes. She has no rhonchi. She has no rales.   Abdominal: Normal appearance.   Musculoskeletal: Normal range of motion.         General: No deformity. Normal range of motion.   Neurological: She is alert and oriented to person, place, and time. She exhibits normal muscle tone. Coordination normal.   Skin: Skin is warm, dry, intact, not diaphoretic and not pale.   Psychiatric: Her speech is normal and behavior is normal. Judgment and thought content normal.   Nursing note and vitals reviewed.      Assessment:     1. COVID-19    2. Subjective  fever    3. Flu-like symptoms        Results for orders placed or performed in visit on 07/26/24   SARS Coronavirus 2 Antigen, POCT Manual Read   Result Value Ref Range    SARS Coronavirus 2 Antigen Positive (A) Negative     Acceptable Yes      COVID risk score: 0    Plan:       COVID-19    Subjective fever  -     SARS Coronavirus 2 Antigen, POCT Manual Read    Flu-like symptoms        Patient Instructions   You have tested positive for COVID-19 today.      ISOLATION  If you tested positive and do not have symptoms, you must isolate for 5 days starting on the day of the positive test. I    If you tested positive and have symptoms, you must isolate for 5 days starting on the day of the first symptoms,  not the day of the positive test.     This is the most important part, both the CDC and the [facility] emphasize that you do not test out of isolation.     After 5 days, if your symptoms have improved and you have not had fever on day 5, you can return to the community on day 6- NO TESTING REQUIRED!      In fact, we do not retest if you were positive in the last 90 days.    After your 5 days of isolation are completed, the CDC recommends strict mask use for the first 5 days that you come out of isolation. PLEASE FOLLOW CDC GUIDELINES REGARDING TRAVEL AFTER COVID INFECTION.    Return to Urgent Care or go to ER if symptoms worsen or fail to improve.  Follow up with PCP as recommended for further management.     Your symptoms should begin to improve by Day 5 of the infection-- if symptoms worsen, you develop a new fever, shortness of breath, worsening shortness of breath with activity, chest pain, worsening cough, you must return to Urgent Care or go to the ER.    PLEASE READ YOUR DISCHARGE INSTRUCTIONS ENTIRELY AS IT CONTAINS IMPORTANT INFORMATION.      Please drink plenty of fluids.    Please get plenty of rest.    Please return here or go to the Emergency Department for any concerns or worsening of  condition.      Tylenol or ibuprofen can also be used as directed for pain and fever unless you have an allergy to them or medical condition such as stomach ulcers, kidney or liver disease or blood thinners etc for which you should not be taking these type of medications.   YOU CAN ALTERNATE TYLENOL AND IBUPROFEN EVERY 3-4 HOURS. Take 1000mg (2 pills) of Extra Strength Acetaminophen (Tylenol) every 6 hours and 600mg (3 pills) of Ibuprofen (Motrin/Advil) every 6 hours, alternating the two so that every 3 hours you take one or the other. Start with the Tylenol, then 3 hours later take the Ibuprofen, then 3 hours later take the Tylenol again, and so on.         For your allergy symptoms and/or runny nose, sinus/ear pressure, congestion:        - You can take over-the-counter claritin, zyrtec, allegra, or xyzal as directed. These are antihistamines that can help with runny nose, nasal congestion, sneezing, and helps to dry up post-nasal drip, which usually causes sore throat and cough.               - If you do NOT have high blood pressure, you may use a decongestant form (D)  of this medication (ie. Claritin- D, zyrtec-D, allegra-D) or if you do not take the D form, you can take sudafed (pseudoephedrine) over the counter, which is a decongestant. Do NOT take two decongestant (D) medications at the same time (such as mucinex-D and claritin-D or plain sudafed and claritin D). Dextromethorphan (DM) is a cough suppressant over the counter (ie. mucinex DM, robitussin, delsym; dayquil/nyquil has DM as well.)    -If you DO have high blood pressure, AVOID DECONGESTANTS (I.e., Phenylephrine and Pseudoephedrine). You may take Coricidin HBP for sinus congestion and Delsym for cough.        - You can take plain Mucinex (guaifenesin) 1200 mg twice a day to help loosen mucous.       Use over the counter flonase or nasocort: one spray each nostril twice daily OR two sprays each nostril once daily until nares dry out, unless you have  Glaucoma.   If you find this dries your nose out or your nose bleeds, try using over the counter nasal saline a few minutes prior to using the flonase to moisten the lining of your nose and throughout the day as needed.   Flonase/nasal spray use directions:  1) Use once per day.  2) Blow nose first.  3) Close one nostril and spray flonase up the other nostril while inhaling gently.   4) If you inhale to aggressively, you will have a bitter taste in the back of your mouth.       You can try breathe right strips at night to help you breathe.  A cool mist humidifier in bedroom may help with cough and relieve stuffy nose.     Sinus rinses DO NOT USE TAP WATER, if you must, water must be a rolling boil for 1 minute, let it cool, then use.  May use distilled water, or over the counter nasal saline rinses.  Vics vapor rub in shower to help open nasal passages.  May use nasal gel to keep passages moisturized.  May use Nasal saline sprays during the day for added relief of congestion.   For those who go to the gym, please do not use the sauna or steam room now to clear sinuses.      Sore throat recommendations: Warm fluids, warm salt water gargles, throat lozenges, tea, honey, soup, rest, hydration.      Cough     Honey with cindy to soothe your throat    Robitussin or Delsyum for cough suppressant for dry cough.    Mucinex DM or products containing Guaifenesin or Dextromethorphan for expectorant (wet cough).    Please follow up with your primary care doctor or specialist in the next 48-72hrs as needed and if no improvement    If you  smoke, please stop smoking.    Please return or see your primary care doctor if you develop new or worsening symptoms.     Please arrange follow up with your primary medical clinic as soon as possible. You must understand that you've received an Urgent Care treatment only and that you may be released before all of your medical problems are known or treated. You, the patient, will arrange for  follow up as instructed. If your symptoms worsen or fail to improve you should go to the Emergency Room.

## 2024-08-09 ENCOUNTER — LAB VISIT (OUTPATIENT)
Dept: LAB | Facility: HOSPITAL | Age: 29
End: 2024-08-09
Attending: NURSE PRACTITIONER
Payer: COMMERCIAL

## 2024-08-09 DIAGNOSIS — F41.1 GENERALIZED ANXIETY DISORDER: ICD-10-CM

## 2024-08-09 DIAGNOSIS — F90.9 ATTENTION DEFICIT HYPERACTIVITY DISORDER (ADHD), UNSPECIFIED ADHD TYPE: ICD-10-CM

## 2024-08-09 LAB
ALBUMIN SERPL BCP-MCNC: 4 G/DL (ref 3.5–5.2)
ALP SERPL-CCNC: 42 U/L (ref 55–135)
ALT SERPL W/O P-5'-P-CCNC: 13 U/L (ref 10–44)
ANION GAP SERPL CALC-SCNC: 8 MMOL/L (ref 8–16)
AST SERPL-CCNC: 16 U/L (ref 10–40)
BASOPHILS # BLD AUTO: 0.06 K/UL (ref 0–0.2)
BASOPHILS NFR BLD: 1.2 % (ref 0–1.9)
BILIRUB SERPL-MCNC: 0.5 MG/DL (ref 0.1–1)
BUN SERPL-MCNC: 17 MG/DL (ref 6–20)
CALCIUM SERPL-MCNC: 9.6 MG/DL (ref 8.7–10.5)
CHLORIDE SERPL-SCNC: 111 MMOL/L (ref 95–110)
CHOLEST SERPL-MCNC: 166 MG/DL (ref 120–199)
CHOLEST/HDLC SERPL: 3 {RATIO} (ref 2–5)
CO2 SERPL-SCNC: 23 MMOL/L (ref 23–29)
CREAT SERPL-MCNC: 0.8 MG/DL (ref 0.5–1.4)
DIFFERENTIAL METHOD BLD: ABNORMAL
EOSINOPHIL # BLD AUTO: 0.1 K/UL (ref 0–0.5)
EOSINOPHIL NFR BLD: 2.4 % (ref 0–8)
ERYTHROCYTE [DISTWIDTH] IN BLOOD BY AUTOMATED COUNT: 12 % (ref 11.5–14.5)
EST. GFR  (NO RACE VARIABLE): >60 ML/MIN/1.73 M^2
GLUCOSE SERPL-MCNC: 81 MG/DL (ref 70–110)
HCT VFR BLD AUTO: 41.8 % (ref 37–48.5)
HDLC SERPL-MCNC: 55 MG/DL (ref 40–75)
HDLC SERPL: 33.1 % (ref 20–50)
HGB BLD-MCNC: 13.9 G/DL (ref 12–16)
IMM GRANULOCYTES # BLD AUTO: 0.01 K/UL (ref 0–0.04)
IMM GRANULOCYTES NFR BLD AUTO: 0.2 % (ref 0–0.5)
LDLC SERPL CALC-MCNC: 102.4 MG/DL (ref 63–159)
LYMPHOCYTES # BLD AUTO: 1.4 K/UL (ref 1–4.8)
LYMPHOCYTES NFR BLD: 27 % (ref 18–48)
MCH RBC QN AUTO: 33.3 PG (ref 27–31)
MCHC RBC AUTO-ENTMCNC: 33.3 G/DL (ref 32–36)
MCV RBC AUTO: 100 FL (ref 82–98)
MONOCYTES # BLD AUTO: 0.5 K/UL (ref 0.3–1)
MONOCYTES NFR BLD: 9.3 % (ref 4–15)
NEUTROPHILS # BLD AUTO: 3 K/UL (ref 1.8–7.7)
NEUTROPHILS NFR BLD: 59.9 % (ref 38–73)
NONHDLC SERPL-MCNC: 111 MG/DL
NRBC BLD-RTO: 0 /100 WBC
PLATELET # BLD AUTO: 208 K/UL (ref 150–450)
PMV BLD AUTO: 12.4 FL (ref 9.2–12.9)
POTASSIUM SERPL-SCNC: 4.2 MMOL/L (ref 3.5–5.1)
PROT SERPL-MCNC: 6.5 G/DL (ref 6–8.4)
RBC # BLD AUTO: 4.18 M/UL (ref 4–5.4)
SODIUM SERPL-SCNC: 142 MMOL/L (ref 136–145)
TRIGL SERPL-MCNC: 43 MG/DL (ref 30–150)
TSH SERPL DL<=0.005 MIU/L-ACNC: 1.36 UIU/ML (ref 0.4–4)
WBC # BLD AUTO: 5.07 K/UL (ref 3.9–12.7)

## 2024-08-09 PROCEDURE — 85025 COMPLETE CBC W/AUTO DIFF WBC: CPT | Performed by: NURSE PRACTITIONER

## 2024-08-09 PROCEDURE — 84443 ASSAY THYROID STIM HORMONE: CPT | Performed by: NURSE PRACTITIONER

## 2024-08-09 PROCEDURE — 80053 COMPREHEN METABOLIC PANEL: CPT | Performed by: NURSE PRACTITIONER

## 2024-08-09 PROCEDURE — 80061 LIPID PANEL: CPT | Performed by: NURSE PRACTITIONER

## 2024-08-09 PROCEDURE — 36415 COLL VENOUS BLD VENIPUNCTURE: CPT | Mod: PN | Performed by: NURSE PRACTITIONER

## 2024-08-27 ENCOUNTER — OFFICE VISIT (OUTPATIENT)
Dept: PRIMARY CARE CLINIC | Facility: CLINIC | Age: 29
End: 2024-08-27
Payer: COMMERCIAL

## 2024-08-27 VITALS
HEART RATE: 71 BPM | OXYGEN SATURATION: 97 % | TEMPERATURE: 98 F | SYSTOLIC BLOOD PRESSURE: 124 MMHG | BODY MASS INDEX: 25.98 KG/M2 | DIASTOLIC BLOOD PRESSURE: 76 MMHG | WEIGHT: 191.81 LBS | HEIGHT: 72 IN

## 2024-08-27 DIAGNOSIS — N92.0 MENORRHAGIA WITH REGULAR CYCLE: ICD-10-CM

## 2024-08-27 DIAGNOSIS — Z00.00 ENCOUNTER FOR ANNUAL PHYSICAL EXAM: Primary | ICD-10-CM

## 2024-08-27 DIAGNOSIS — F41.1 GENERALIZED ANXIETY DISORDER: ICD-10-CM

## 2024-08-27 DIAGNOSIS — F90.9 ATTENTION DEFICIT HYPERACTIVITY DISORDER (ADHD), UNSPECIFIED ADHD TYPE: ICD-10-CM

## 2024-08-27 DIAGNOSIS — D64.9 ANEMIA, UNSPECIFIED TYPE: ICD-10-CM

## 2024-08-27 PROCEDURE — 99215 OFFICE O/P EST HI 40 MIN: CPT | Mod: S$GLB,,, | Performed by: NURSE PRACTITIONER

## 2024-08-27 PROCEDURE — 99999 PR PBB SHADOW E&M-EST. PATIENT-LVL III: CPT | Mod: PBBFAC,,, | Performed by: NURSE PRACTITIONER

## 2024-08-27 RX ORDER — LISDEXAMFETAMINE DIMESYLATE 60 MG/1
60 CAPSULE ORAL EVERY MORNING
Qty: 30 CAPSULE | Refills: 0 | Status: SHIPPED | OUTPATIENT
Start: 2024-08-27

## 2024-08-27 RX ORDER — FLUOXETINE HYDROCHLORIDE 20 MG/1
20 CAPSULE ORAL DAILY
Qty: 90 CAPSULE | Refills: 3 | Status: SHIPPED | OUTPATIENT
Start: 2024-08-27

## 2024-08-27 NOTE — PROGRESS NOTES
Chief Complaint  Chief Complaint   Patient presents with    Follow-up     On medicine refill/ 3 month follow up/ADHD        History of Present Illness    Ms. Ortega presents today for medication management and follow-up.    She reports that Vyvanse is starting to wear off, particularly in the afternoon at work around 1-2 PM. She experiences difficulty completing tasks and tends to start multiple projects without finishing them, affecting her work performance as a  at [employer]. She describes her job as a high-stress environment. She is considering increasing Vyvanse to 60mg. She denies any irritable side effects from her current medication regimen.    She is currently taking Prozac 20mg and reports that it is working well for her.    Her comprehensive metabolic panel shows normal kidney and liver function. There is a possibility of anemia, which is noted to be common in women of childbearing ages. Thyroid screening results are within normal limits. Cholesterol levels are reported as normal.    She reports a history of menorrhagia with heavy menstrual flow. She previously used birth control but discontinued it due to dislike. Her last GYN visit was approximately one year ago, which included a Pap smear. She denies current pregnancy.      ROS:  General: -fever, -chills, -fatigue, -weight gain, -weight loss  Eyes: -vision changes, -redness, -discharge  ENT: -ear pain, -nasal congestion, -sore throat  Cardiovascular: -chest pain, -palpitations, -lower extremity edema  Respiratory: -cough, -shortness of breath  Gastrointestinal: -abdominal pain, -nausea, -vomiting, -diarrhea, -constipation, -blood in stool  Genitourinary: -dysuria, -hematuria, -frequency  Musculoskeletal: -joint pain, -muscle pain  Skin: -rash, -lesion  Neurological: -headache, -dizziness, -numbness, -tingling  Psychiatric: -anxiety, -depression, -sleep difficulty, -irritability          PAST MEDICAL HISTORY:  History reviewed. No pertinent past medical  history.    PAST SURGICAL HISTORY:  History reviewed. No pertinent surgical history.    SOCIAL HISTORY:  Social History     Socioeconomic History    Marital status:    Tobacco Use    Smoking status: Never     Passive exposure: Never    Smokeless tobacco: Never   Substance and Sexual Activity    Alcohol use: Never    Sexual activity: Yes     Birth control/protection: Condom, Other-see comments     Comment: Birth control pill     Social Determinants of Health     Financial Resource Strain: Low Risk  (7/23/2024)    Overall Financial Resource Strain (CARDIA)     Difficulty of Paying Living Expenses: Not hard at all   Food Insecurity: No Food Insecurity (7/23/2024)    Hunger Vital Sign     Worried About Running Out of Food in the Last Year: Never true     Ran Out of Food in the Last Year: Never true   Physical Activity: Sufficiently Active (7/23/2024)    Exercise Vital Sign     Days of Exercise per Week: 5 days     Minutes of Exercise per Session: 30 min   Stress: No Stress Concern Present (7/23/2024)    [employer] - Occupational Stress Questionnaire     Feeling of Stress : Only a little   Housing Stability: Unknown (7/23/2024)    Housing Stability Vital Sign     Unable to Pay for Housing in the Last Year: No       FAMILY HISTORY:  No family history on file.    ALLERGIES AND MEDICATIONS: updated and reviewed.  Review of patient's allergies indicates:  No Known Allergies  Current Outpatient Medications   Medication Sig Dispense Refill    FLUoxetine 20 MG capsule Take 1 capsule (20 mg total) by mouth once daily. 90 capsule 3    lisdexamfetamine (VYVANSE) 50 MG capsule Take 1 capsule (50 mg total) by mouth every morning. 30 capsule 0    lisdexamfetamine (VYVANSE) 60 MG capsule Take 1 capsule (60 mg total) by mouth every morning. 30 capsule 0    ondansetron (ZOFRAN-ODT) 4 MG TbDL Take 1 tablet (4 mg total) by mouth every 6 (six) hours as needed (nausea). 30 tablet 0     No current  facility-administered medications for this visit.       Physical Exam    General: No acute distress. Well-developed. Well-nourished.  Head: Normocephalic. Atraumatic.  Eyes: EOMI. PERRLA. Conjunctivae non-injected. Sclerae anicteric.  Ears: Bilateral EACs clear. Bilateral TMs clear and intact.  Nose: Nares patent. Normal turbinates. No nasal discharge.  Mouth: Moist oral mucosa. Normal dentition.  Throat: No erythema. No exudate. Uvula midline.  Neck: Supple. Full ROM. Non-tender. No JVD. No carotid bruits. No thyromegaly. No lymphadenopathy.  Cardiovascular: Regular rate. Regular rhythm. No murmurs. No rubs. No gallops. Normal S1, S2. Peripheral pulses 2+ and symmetric.  Respiratory: Normal respiratory effort. Clear to auscultation bilaterally. No rales. No rhonchi. No wheezing.  Abdomen: Soft. Non-tender. Non-distended. Normal bowel sounds. Negative McBurney's. Negative Brown's. No rebound. No guarding. No hepatosplenomegaly. No masses.  Musculoskeletal: No  obvious deformity. Normal muscle development. Normal muscle tone. FROM at all joints. No swelling. No tenderness.  Back: No CVA tenderness. No spinal deformity.  Extremities: No cyanosis. No clubbing. No upper extremity edema. No lower extremity edema.  Neurological: Alert & oriented x3. CN II-XII intact. Reflexes 2+ throughout. Strength 5/5 in all extremities. Sensation intact. No slurred speech. Normal gait.  Psychiatric: Normal mood. Normal affect. Good insight. Good judgment. No evidence of suicidal ideation. No evidence of homicidal ideation.  Skin: Warm. Dry. Intact. No rash. No ulcers. No suspicious lesions.        Vitals:    08/27/24 1346   BP: 124/76   Pulse: 71   Temp: 97.6 °F (36.4 °C)   SpO2: 97%   Weight: 87 kg (191 lb 12.8 oz)   Height: 6' (1.829 m)    Body mass index is 26.01 kg/m².  Weight: 87 kg (191 lb 12.8 oz)   Height: 6' (182.9 cm)       Health Maintenance         Date Due Completion Date    TETANUS VACCINE Never done ---    COVID-19  Vaccine (3 - 2023-24 season) 09/01/2023 12/19/2021    Influenza Vaccine (1) 09/01/2024 ---    Pap Smear 05/11/2025 5/11/2022            Assessment & Plan      IMPRESSION:  Assessed patient's response to current Vyvanse and Prozac regimen  Evaluated lab results, noting normal kidney and liver function  Identified possible iron deficiency anemia, likely due to chronic blood loss from menorrhagia  Considered increasing Vyvanse dosage to address ADHD symptoms wearing off during work hours  Reviewed cholesterol and thyroid screening results, both within normal limits    ANEMIA:  - Explained the potential link between menorrhagia and iron deficiency anemia.  - Discussed iron-rich food sources including berries, fruits with seeds, beans, legumes, trail mix, flaxseed, greens, chicken breast, and fish.  - Informed about potential constipation as a side effect of iron supplementation.  - Ms. Ortega to increase consumption of iron-rich foods in diet.  - Ms. Ortega can consider OTC iron supplementation (ferrous sulfate).  - Ms. Ortega to maintain adequate roughage in diet if taking iron supplements.    MENORRHAGIA:  - Referred to gynecology for evaluation of menorrhagia and anemia.    MEDICATIONS/SUPPLEMENTS:  - Increased Vyvanse from 50 mg to 60 mg daily.  - Continued Prozac 20 mg daily.    FOLLOW UP:  - Follow up in 3 months.  - Schedule annual well-woman exam with gynecology at this location (options given for specific days and providers).        Problem List Items Addressed This Visit          Psychiatric    Attention deficit hyperactivity disorder    Relevant Medications    lisdexamfetamine (VYVANSE) 60 MG capsule    Generalized anxiety disorder    Relevant Medications    FLUoxetine 20 MG capsule       Renal/    Menorrhagia    Relevant Orders    Ambulatory referral/consult to Obstetrics / Gynecology     Other Visit Diagnoses       Encounter for annual physical exam    -  Primary    Relevant Orders    Ambulatory  referral/consult to Obstetrics / Gynecology    Anemia, unspecified type        Relevant Orders    Ambulatory referral/consult to Obstetrics / Gynecology              Healthcare Maintenance: As addressed above.    20+ minutes of total time spent on the encounter, which includes face to face time and non-face to face time preparing to see the patient (eg, review of tests), Obtaining and/or reviewing separately obtained history, documenting clinical information in the electronic or other health record, independently interpreting results (not separately reported) and communicating results to the patient/family/caregiver, or Care coordination (not separately reported). Visit today included increased complexity associated with the care of the episodic problem addressed and managing the longitudinal care of the patient due to the serious and/or complex managed problem(s).        Sincerely,  Akhil Gunter NP

## 2024-08-30 ENCOUNTER — PATIENT MESSAGE (OUTPATIENT)
Dept: OBSTETRICS AND GYNECOLOGY | Facility: CLINIC | Age: 29
End: 2024-08-30
Payer: COMMERCIAL

## 2024-10-07 DIAGNOSIS — F90.9 ATTENTION DEFICIT HYPERACTIVITY DISORDER (ADHD), UNSPECIFIED ADHD TYPE: ICD-10-CM

## 2024-10-07 RX ORDER — LISDEXAMFETAMINE DIMESYLATE 60 MG/1
60 CAPSULE ORAL EVERY MORNING
Qty: 30 CAPSULE | Refills: 0 | Status: SHIPPED | OUTPATIENT
Start: 2024-10-07

## 2024-11-11 ENCOUNTER — OFFICE VISIT (OUTPATIENT)
Dept: URGENT CARE | Facility: CLINIC | Age: 29
End: 2024-11-11
Payer: COMMERCIAL

## 2024-11-11 VITALS
HEART RATE: 58 BPM | SYSTOLIC BLOOD PRESSURE: 113 MMHG | OXYGEN SATURATION: 97 % | TEMPERATURE: 99 F | DIASTOLIC BLOOD PRESSURE: 74 MMHG | RESPIRATION RATE: 18 BRPM

## 2024-11-11 DIAGNOSIS — N30.00 ACUTE CYSTITIS WITHOUT HEMATURIA: Primary | ICD-10-CM

## 2024-11-11 DIAGNOSIS — R39.9 UTI SYMPTOMS: ICD-10-CM

## 2024-11-11 LAB
B-HCG UR QL: NEGATIVE
BILIRUBIN, UA POC OHS: NEGATIVE
BLOOD, UA POC OHS: NEGATIVE
CLARITY, UA POC OHS: ABNORMAL
COLOR, UA POC OHS: ABNORMAL
CTP QC/QA: YES
GLUCOSE, UA POC OHS: 100
KETONES, UA POC OHS: NEGATIVE
LEUKOCYTES, UA POC OHS: NEGATIVE
NITRITE, UA POC OHS: POSITIVE
PH, UA POC OHS: 5
PROTEIN, UA POC OHS: NEGATIVE
SPECIFIC GRAVITY, UA POC OHS: >=1.03
UROBILINOGEN, UA POC OHS: 0.2

## 2024-11-11 PROCEDURE — 99213 OFFICE O/P EST LOW 20 MIN: CPT | Mod: S$GLB,,,

## 2024-11-11 PROCEDURE — 81025 URINE PREGNANCY TEST: CPT | Mod: S$GLB,,,

## 2024-11-11 PROCEDURE — 81003 URINALYSIS AUTO W/O SCOPE: CPT | Mod: QW,S$GLB,,

## 2024-11-11 RX ORDER — NITROFURANTOIN 25; 75 MG/1; MG/1
100 CAPSULE ORAL 2 TIMES DAILY
Qty: 10 CAPSULE | Refills: 0 | Status: SHIPPED | OUTPATIENT
Start: 2024-11-11 | End: 2024-11-15

## 2024-11-11 NOTE — PROGRESS NOTES
"Subjective:      Patient ID: Rebeca Ortega is a 28 y.o. female.    Vitals:  oral temperature is 98.5 °F (36.9 °C). Her blood pressure is 113/74 and her pulse is 58 (abnormal). Her respiration is 18 and oxygen saturation is 97%.     Chief Complaint: Dysuria    29 yo female Pt presents today with dysuria, frequency, urgency and witholding x's 1 week. Pt has taken AZO with mild relief, last dose was last night.  "I've only had this 3 times in my life, normally if I drink a lot of water, I can flush it out and it was working initially but then it came back." Denies fever, chills, abdominal surgeries, flank pain, back pain, abdominal pain, n/v/d. NKDA.     Dysuria   This is a new problem. The current episode started in the past 7 days. The problem occurs every urination. The problem has been unchanged. The quality of the pain is described as burning. The pain is at a severity of 6/10. The pain is moderate. There has been no fever. She is Sexually active. Associated symptoms include frequency, urgency and withholding. Pertinent negatives include no chills, flank pain, nausea, vomiting or rash. She has tried home medications for the symptoms. The treatment provided mild relief.       Constitution: Negative for chills and fever.   Cardiovascular:  Negative for chest pain.   Respiratory:  Negative for shortness of breath.    Gastrointestinal:  Negative for abdominal pain, history of abdominal surgery, nausea, vomiting and diarrhea.   Genitourinary:  Positive for dysuria, frequency and urgency. Negative for flank pain and missed menses.   Musculoskeletal:  Negative for back pain.   Skin:  Negative for rash.      Objective:     Vitals:    11/11/24 0829   BP: 113/74   Pulse: (!) 58   Resp: 18   Temp: 98.5 °F (36.9 °C)       Physical Exam   Constitutional: She is oriented to person, place, and time. She appears well-developed.  Non-toxic appearance. She does not appear ill. No distress.   HENT:   Head: Normocephalic and " atraumatic.   Ears:   Right Ear: External ear normal.   Left Ear: External ear normal.   Nose: Nose normal. No nasal deformity. No epistaxis.   Mouth/Throat: Oropharynx is clear and moist and mucous membranes are normal.   Eyes: Lids are normal.   Neck: Trachea normal and phonation normal. Neck supple.   Cardiovascular: Normal rate, regular rhythm, normal heart sounds and normal pulses.   Pulmonary/Chest: Effort normal and breath sounds normal. No stridor. No respiratory distress. She has no wheezes. She has no rhonchi. She has no rales.   Abdominal: Normal appearance and bowel sounds are normal. She exhibits no distension. Soft. There is no abdominal tenderness. There is no rebound, no guarding, no tenderness at McBurney's point, no left CVA tenderness, negative Rovsing's sign and no right CVA tenderness.   Neurological: She is alert and oriented to person, place, and time. Gait normal.   Skin: Skin is warm, dry, intact, not diaphoretic and no rash.   Psychiatric: Her speech is normal and behavior is normal.   Nursing note and vitals reviewed.      Assessment:     1. Acute cystitis without hematuria    2. UTI symptoms        Results for orders placed or performed in visit on 11/11/24   POCT Urinalysis(Instrument)    Collection Time: 11/11/24  8:37 AM   Result Value Ref Range    Color, POC UA Bernarda (A) Yellow, Straw, Colorless    Clarity, POC UA Slight Cloudy (A) Clear    Glucose, POC  (A) Negative    Bilirubin, POC UA Negative Negative    Ketones, POC UA Negative Negative    Spec Grav POC UA >=1.030 1.005 - 1.030    Blood, POC UA Negative Negative    pH, POC UA 5.0 5.0 - 8.0    Protein, POC UA Negative Negative    Urobilinogen, POC UA 0.2 <=1.0    Nitrite, POC UA Positive (A) Negative    WBC, POC UA Negative Negative   POCT urine pregnancy    Collection Time: 11/11/24  8:56 AM   Result Value Ref Range    POC Preg Test, Ur Negative Negative     Acceptable Yes        Plan:       Acute cystitis  without hematuria  -     nitrofurantoin, macrocrystal-monohydrate, (MACROBID) 100 MG capsule; Take 1 capsule (100 mg total) by mouth 2 (two) times daily.  Dispense: 10 capsule; Refill: 0    UTI symptoms  -     POCT Urinalysis(Instrument)  -     POCT urine pregnancy        Patient Instructions   PLEASE READ YOUR DISCHARGE INSTRUCTIONS ENTIRELY AS IT CONTAINS IMPORTANT INFORMATION.    Take the antibiotics to completion.     Drink plenty of fluids, wipe front to back, take showers not baths, no scented soaps, wear breathable cotton underwear, urinate after sexual intercourse.     If you were given a prescription for Pyridium: Take the pyridium three times a day with meals. It will turn your urine orange. If you wear contacts it can turn your contacts orange. You do not need to take the whole prescription you can stop this once the pain is better and finish out the antibiotics.    If you are are female and on BCP use additional methods to prevent pregnancy while on the antibiotics and for one cycle after.   Cranberry juice may help. Get the 100% cranberry juice and mix 4 oz of juice with 4 oz of water and drink this 8 oz glass of liquid once a day.     Please go to the ER for worsening symptoms including fever, worsening flank pain, vomiting, etc.     Please return or see your primary care doctor if you develop new or worsening symptoms.     Please arrange follow up with your primary medical clinic as soon as possible. You must understand that you've received an Urgent Care treatment only and that you may be released before all of your medical problems are known or treated. You, the patient, will arrange for follow up as instructed. If your symptoms worsen or fail to improve you should go to the Emergency Room.    WE CANNOT RULE OUT ALL POSSIBLE CAUSES OF YOUR SYMPTOMS IN THE URGENT CARE SETTING PLEASE GO TO THE ER IF YOU FEELS YOUR CONDITION IS WORSENING OR YOU WOULD LIKE EMERGENT EVALUATION.      Medical Decision  Making:   History:   Old Medical Records: I decided to obtain old medical records.  Clinical Tests:   Lab Tests: Reviewed and Ordered  The following lab test(s) were unremarkable: UPT       <> Summary of Lab: UA abnormalities as recorded:   Color, POC UA Bernarda (A)    Clarity, POC UA Slight Cloudy (A)    Glucose, POC  (A)    Spec Grav POC UA >=1.030    Nitrite, POC UA Positive (A)       Urgent Care Management:  Reviewed abnormal urinalysis with patient who verbalized understanding. Discussed diagnosis and treatment plan while  also discussed over-the-counter medication remedies to help support current symptoms.  Patient educational handout also included in discharge paperwork and was given to patient who verbalized understanding agrees with plan of care.  Patient denies any further questions or concerns at this time.  Patient exits exam room in no acute distress.

## 2024-11-11 NOTE — PATIENT INSTRUCTIONS
PLEASE READ YOUR DISCHARGE INSTRUCTIONS ENTIRELY AS IT CONTAINS IMPORTANT INFORMATION.    Take the antibiotics to completion.     Drink plenty of fluids, wipe front to back, take showers not baths, no scented soaps, wear breathable cotton underwear, urinate after sexual intercourse.     If you were given a prescription for Pyridium: Take the pyridium three times a day with meals. It will turn your urine orange. If you wear contacts it can turn your contacts orange. You do not need to take the whole prescription you can stop this once the pain is better and finish out the antibiotics.    If you are are female and on BCP use additional methods to prevent pregnancy while on the antibiotics and for one cycle after.   Cranberry juice may help. Get the 100% cranberry juice and mix 4 oz of juice with 4 oz of water and drink this 8 oz glass of liquid once a day.     Please go to the ER for worsening symptoms including fever, worsening flank pain, vomiting, etc.     Please return or see your primary care doctor if you develop new or worsening symptoms.     Please arrange follow up with your primary medical clinic as soon as possible. You must understand that you've received an Urgent Care treatment only and that you may be released before all of your medical problems are known or treated. You, the patient, will arrange for follow up as instructed. If your symptoms worsen or fail to improve you should go to the Emergency Room.    WE CANNOT RULE OUT ALL POSSIBLE CAUSES OF YOUR SYMPTOMS IN THE URGENT CARE SETTING PLEASE GO TO THE ER IF YOU FEELS YOUR CONDITION IS WORSENING OR YOU WOULD LIKE EMERGENT EVALUATION.

## 2024-11-15 ENCOUNTER — LAB VISIT (OUTPATIENT)
Dept: LAB | Facility: HOSPITAL | Age: 29
End: 2024-11-15
Attending: NURSE PRACTITIONER
Payer: COMMERCIAL

## 2024-11-15 ENCOUNTER — OFFICE VISIT (OUTPATIENT)
Dept: PRIMARY CARE CLINIC | Facility: CLINIC | Age: 29
End: 2024-11-15
Payer: COMMERCIAL

## 2024-11-15 VITALS
HEART RATE: 80 BPM | SYSTOLIC BLOOD PRESSURE: 128 MMHG | DIASTOLIC BLOOD PRESSURE: 86 MMHG | TEMPERATURE: 98 F | WEIGHT: 194.25 LBS | RESPIRATION RATE: 18 BRPM | OXYGEN SATURATION: 97 % | HEIGHT: 72 IN | BODY MASS INDEX: 26.31 KG/M2

## 2024-11-15 DIAGNOSIS — N39.0 ACUTE UTI: Primary | ICD-10-CM

## 2024-11-15 DIAGNOSIS — T36.95XA ANTIBIOTIC-INDUCED YEAST INFECTION: ICD-10-CM

## 2024-11-15 DIAGNOSIS — B37.9 ANTIBIOTIC-INDUCED YEAST INFECTION: ICD-10-CM

## 2024-11-15 DIAGNOSIS — N39.0 ACUTE UTI: ICD-10-CM

## 2024-11-15 LAB
BILIRUB UR QL STRIP: NEGATIVE
CLARITY UR REFRACT.AUTO: CLEAR
COLOR UR AUTO: YELLOW
GLUCOSE UR QL STRIP: NEGATIVE
HGB UR QL STRIP: ABNORMAL
KETONES UR QL STRIP: NEGATIVE
LEUKOCYTE ESTERASE UR QL STRIP: NEGATIVE
MICROSCOPIC COMMENT: NORMAL
NITRITE UR QL STRIP: NEGATIVE
PH UR STRIP: 7 [PH] (ref 5–8)
PROT UR QL STRIP: NEGATIVE
RBC #/AREA URNS AUTO: 1 /HPF (ref 0–4)
SP GR UR STRIP: 1.01 (ref 1–1.03)
SQUAMOUS #/AREA URNS AUTO: 7 /HPF
URN SPEC COLLECT METH UR: ABNORMAL
WBC #/AREA URNS AUTO: 4 /HPF (ref 0–5)

## 2024-11-15 PROCEDURE — 99999 PR PBB SHADOW E&M-EST. PATIENT-LVL IV: CPT | Mod: PBBFAC,,, | Performed by: NURSE PRACTITIONER

## 2024-11-15 PROCEDURE — 99213 OFFICE O/P EST LOW 20 MIN: CPT | Mod: S$GLB,,, | Performed by: NURSE PRACTITIONER

## 2024-11-15 PROCEDURE — 81001 URINALYSIS AUTO W/SCOPE: CPT | Performed by: NURSE PRACTITIONER

## 2024-11-15 RX ORDER — AMOXICILLIN AND CLAVULANATE POTASSIUM 875; 125 MG/1; MG/1
1 TABLET, FILM COATED ORAL EVERY 12 HOURS
Qty: 14 TABLET | Refills: 0 | Status: SHIPPED | OUTPATIENT
Start: 2024-11-15 | End: 2024-11-22

## 2024-11-15 RX ORDER — FLUCONAZOLE 150 MG/1
150 TABLET ORAL
Qty: 2 TABLET | Refills: 0 | Status: SHIPPED | OUTPATIENT
Start: 2024-11-15

## 2024-11-15 NOTE — PROGRESS NOTES
Chief Complaint  Chief Complaint   Patient presents with    Urinary Tract Infection     Recurring UTI         History of Present Illness    Ms. Ortega presents today for follow-up of urinary tract infection.    She reports a recent urgent care visit approximately 4 days ago where she tested positive for nitrites in her urine. She was initially prescribed Macrobid, which provided relief for about 24 hours. However, symptoms returned last night with shooting pain. She describes current symptoms as persistent and uncomfortable, localized with a shooting sensation. She expresses concern about the possibility of kidney stones. Her urine is currently dark, which she attributes to menstruation. By day three of treatment, she reports no improvement in symptoms with Macrobid.    She is currently menstruating. She is not able to confirm a history of antibiotic-induced yeast infections given it has been a while since her last occurrence. She denies experiencing yeast infections as a normal response to antibiotic use.    She denies any allergies to food, bugs, or drugs.      ROS:  General: -fever, -chills, -fatigue, -weight gain, -weight loss  Eyes: -vision changes, -redness, -discharge  ENT: -ear pain, -nasal congestion, -sore throat  Cardiovascular: -chest pain, -palpitations, -lower extremity edema  Respiratory: -cough, -shortness of breath  Gastrointestinal: -abdominal pain, -nausea, -vomiting, -diarrhea, -constipation, -blood in stool  Genitourinary: -dysuria, -hematuria, -frequency, +urine changes  Musculoskeletal: +joint pain, -muscle pain  Skin: -rash, -lesion  Neurological: -headache, -dizziness, -numbness, -tingling  Psychiatric: -anxiety, -depression, -sleep difficulty  Allergic: -allergic reactions          PAST MEDICAL HISTORY:  History reviewed. No pertinent past medical history.    PAST SURGICAL HISTORY:  History reviewed. No pertinent surgical history.    SOCIAL HISTORY:  Social History     Socioeconomic History     Marital status:    Tobacco Use    Smoking status: Never     Passive exposure: Never    Smokeless tobacco: Never   Substance and Sexual Activity    Alcohol use: Never    Sexual activity: Yes     Birth control/protection: Condom, Other-see comments     Comment: Birth control pill     Social Drivers of Health     Financial Resource Strain: Low Risk  (7/23/2024)    Overall Financial Resource Strain (CARDIA)     Difficulty of Paying Living Expenses: Not hard at all   Food Insecurity: No Food Insecurity (7/23/2024)    Hunger Vital Sign     Worried About Running Out of Food in the Last Year: Never true     Ran Out of Food in the Last Year: Never true   Physical Activity: Sufficiently Active (7/23/2024)    Exercise Vital Sign     Days of Exercise per Week: 5 days     Minutes of Exercise per Session: 30 min   Stress: No Stress Concern Present (7/23/2024)    [employer] - Occupational Stress Questionnaire     Feeling of Stress : Only a little   Housing Stability: Unknown (7/23/2024)    Housing Stability Vital Sign     Unable to Pay for Housing in the Last Year: No       FAMILY HISTORY:  No family history on file.    ALLERGIES AND MEDICATIONS: updated and reviewed.  Review of patient's allergies indicates:  No Known Allergies  Current Outpatient Medications   Medication Sig Dispense Refill    amoxicillin-clavulanate 875-125mg (AUGMENTIN) 875-125 mg per tablet Take 1 tablet by mouth every 12 (twelve) hours. for 7 days 14 tablet 0    fluconazole (DIFLUCAN) 150 MG Tab Take 1 tablet (150 mg total) by mouth every 72 hours. 2 tablet 0    FLUoxetine 20 MG capsule Take 1 capsule (20 mg total) by mouth once daily. 90 capsule 3    lisdexamfetamine (VYVANSE) 50 MG capsule Take 1 capsule (50 mg total) by mouth every morning. 30 capsule 0    lisdexamfetamine (VYVANSE) 60 MG capsule Take 1 capsule (60 mg total) by mouth every morning. 30 capsule 0     No current facility-administered medications for  this visit.           Physical Exam    General: No acute distress. Well-developed. Well-nourished.  Head: Normocephalic. Atraumatic.  Eyes: EOMI. PERRLA. Conjunctivae non-injected. Sclerae anicteric.  Ears: Bilateral EACs clear. Bilateral TMs clear and intact.  Nose: Nares patent. Normal turbinates. No nasal discharge.  Mouth: Moist oral mucosa. Normal dentition.  Throat: No erythema. No exudate. Uvula midline.  Neck: Supple. Full ROM. Non-tender. No JVD. No carotid bruits. No thyromegaly. No lymphadenopathy.  Cardiovascular: Regular rate. Regular rhythm. No murmurs. No rubs. No gallops. Normal S1, S2. Peripheral pulses 2+ and symmetric.  Respiratory: Normal respiratory effort. Clear to auscultation bilaterally. No rales. No rhonchi. No wheezing.  Abdomen: Soft. Non-tender. Non-distended. Normal bowel sounds. Negative McBurney's. Negative Brown's. No rebound. No guarding. No hepatosplenomegaly. No masses.  Musculoskeletal: No  obvious deformity. Normal muscle development. Normal muscle tone. FROM at all joints. No swelling. No tenderness.  Back: No CVA tenderness. No spinal deformity.  Extremities: No cyanosis. No clubbing. No upper extremity edema. No lower extremity edema.  Neurological: Alert & oriented x3. CN II-XII intact. Reflexes 2+ throughout. Strength 5/5 in all extremities. Sensation intact. No slurred speech. Normal gait.  Psychiatric: Normal mood. Normal affect. Good insight. Good judgment. No evidence of suicidal ideation. No evidence of homicidal ideation.  Skin: Warm. Dry. Intact. No rash. No ulcers. No suspicious lesions.  Female Genitourinary: Lower pelvic pain.        Vitals:    11/15/24 0825   BP: 128/86   BP Location: Right arm   Patient Position: Sitting   Pulse: 80   Resp: 18   Temp: 98 °F (36.7 °C)   TempSrc: Tympanic   SpO2: 97%   Weight: 88.1 kg (194 lb 3.6 oz)   Height: 6' (1.829 m)    Body mass index is 26.34 kg/m².  Weight: 88.1 kg (194 lb 3.6 oz)   Height: 6' (182.9 cm)       Health  Maintenance         Date Due Completion Date    TETANUS VACCINE Never done ---    Influenza Vaccine (1) Never done ---    COVID-19 Vaccine (3 - 2024-25 season) 09/01/2024 12/19/2021    Pap Smear 05/11/2025 5/11/2022    RSV Vaccine (Age 60+ and Pregnant patients) (1 - 1-dose 75+ series) 12/30/2069 ---            Assessment & Plan      IMPRESSION:  -Diagnosed UTI based on positive nitrites in urine dipstick at urgent care  -Switched from Macrobid to Augmentin due to lack of symptom improvement  -Ordered urine culture for more specific analysis, results expected in 72 hours  -Considering possibility of kidney stones, awaiting urinalysis results for confirmation  -Anticipating potential hematuria in urinalysis, complicated by patient's menstrual cycle    URINARY TRACT INFECTION:  Discontinued Macrobid.  Started Augmentin 875-125 mg twice daily for 7 days.  Explained that Augmentin covers gram-negative, gram-positive, and some atypical bacteria.  Urine culture and urinalysis ordered.  Ms. Ortega to increase yogurt intake for probiotics.  Recommend considering probiotic drinks or OTC probiotic tablets.  Discussed potential antibiotic-induced yeast infections.  Prescribed Diflucan (fluconazole) for potential yeast infection: Do not fill unless symptoms occur.  If needed, take 1 dose; wait at least 72 hours before taking 2nd dose if necessary.    GENERAL INSTRUCTIONS AND FOLLOW-UP:  Educated on signs of allergic reactions to antibiotics and when to seek emergency care.  Contact the office if symptoms worsen.  Go to the ER if condition significantly deteriorates.        Problem List Items Addressed This Visit    None  Visit Diagnoses       Acute UTI    -  Primary    Relevant Medications    amoxicillin-clavulanate 875-125mg (AUGMENTIN) 875-125 mg per tablet    fluconazole (DIFLUCAN) 150 MG Tab    Other Relevant Orders    Urinalysis, Reflex to Urine Culture Urine, Clean Catch (Completed)    Antibiotic-induced yeast infection         Relevant Medications    amoxicillin-clavulanate 875-125mg (AUGMENTIN) 875-125 mg per tablet    fluconazole (DIFLUCAN) 150 MG Tab              Healthcare Maintenance: Deferred at this time      15+ minutes of total time spent on the encounter, which includes face to face time and non-face to face time preparing to see the patient (eg, review of tests), Obtaining and/or reviewing separately obtained history, documenting clinical information in the electronic or other health record, independently interpreting results (not separately reported) and communicating results to the patient/family/caregiver, or Care coordination (not separately reported). Visit today included increased complexity associated with the care of the episodic problem addressed and managing the longitudinal care of the patient due to the serious and/or complex managed problem(s).        Sincerely,  Akhil Gunter NP

## 2024-11-18 ENCOUNTER — PATIENT MESSAGE (OUTPATIENT)
Dept: PRIMARY CARE CLINIC | Facility: CLINIC | Age: 29
End: 2024-11-18
Payer: COMMERCIAL

## 2024-11-19 ENCOUNTER — PATIENT MESSAGE (OUTPATIENT)
Dept: PRIMARY CARE CLINIC | Facility: CLINIC | Age: 29
End: 2024-11-19

## 2024-11-20 ENCOUNTER — OFFICE VISIT (OUTPATIENT)
Dept: UROLOGY | Facility: CLINIC | Age: 29
End: 2024-11-20
Payer: COMMERCIAL

## 2024-11-20 ENCOUNTER — OFFICE VISIT (OUTPATIENT)
Dept: PRIMARY CARE CLINIC | Facility: CLINIC | Age: 29
End: 2024-11-20
Payer: COMMERCIAL

## 2024-11-20 VITALS
DIASTOLIC BLOOD PRESSURE: 68 MMHG | OXYGEN SATURATION: 99 % | HEIGHT: 72 IN | TEMPERATURE: 99 F | HEART RATE: 79 BPM | WEIGHT: 196.19 LBS | RESPIRATION RATE: 18 BRPM | BODY MASS INDEX: 26.57 KG/M2 | SYSTOLIC BLOOD PRESSURE: 122 MMHG

## 2024-11-20 VITALS — WEIGHT: 189.13 LBS | HEIGHT: 72 IN | BODY MASS INDEX: 25.62 KG/M2

## 2024-11-20 DIAGNOSIS — R35.0 URINARY FREQUENCY: ICD-10-CM

## 2024-11-20 DIAGNOSIS — N39.43 DRIBBLING FOLLOWING URINATION: Primary | ICD-10-CM

## 2024-11-20 DIAGNOSIS — R19.00 PELVIC FULLNESS: ICD-10-CM

## 2024-11-20 DIAGNOSIS — R39.15 URINARY URGENCY: ICD-10-CM

## 2024-11-20 DIAGNOSIS — R30.0 DYSURIA: ICD-10-CM

## 2024-11-20 DIAGNOSIS — N39.43 DRIBBLING FOLLOWING URINATION: ICD-10-CM

## 2024-11-20 DIAGNOSIS — R39.15 URINARY URGENCY: Primary | ICD-10-CM

## 2024-11-20 LAB
BILIRUB UR QL STRIP: NEGATIVE
CLARITY UR: CLEAR
COLOR UR: YELLOW
GLUCOSE UR QL STRIP: NEGATIVE
HGB UR QL STRIP: NEGATIVE
KETONES UR QL STRIP: NEGATIVE
LEUKOCYTE ESTERASE UR QL STRIP: NEGATIVE
NITRITE UR QL STRIP: NEGATIVE
PH UR STRIP: 7 [PH] (ref 5–8)
POC RESIDUAL URINE VOLUME: 0 ML (ref 0–100)
PROT UR QL STRIP: NEGATIVE
SP GR UR STRIP: 1.02 (ref 1–1.03)
URN SPEC COLLECT METH UR: NORMAL
UROBILINOGEN UR STRIP-ACNC: NEGATIVE EU/DL

## 2024-11-20 PROCEDURE — 99213 OFFICE O/P EST LOW 20 MIN: CPT | Mod: S$GLB,,, | Performed by: NURSE PRACTITIONER

## 2024-11-20 PROCEDURE — 99999 PR PBB SHADOW E&M-EST. PATIENT-LVL V: CPT | Mod: PBBFAC,,, | Performed by: NURSE PRACTITIONER

## 2024-11-20 PROCEDURE — 81003 URINALYSIS AUTO W/O SCOPE: CPT | Performed by: NURSE PRACTITIONER

## 2024-11-20 PROCEDURE — 87563 M. GENITALIUM AMP PROBE: CPT | Performed by: NURSE PRACTITIONER

## 2024-11-20 PROCEDURE — 99999 PR PBB SHADOW E&M-EST. PATIENT-LVL III: CPT | Mod: PBBFAC,,,

## 2024-11-20 RX ORDER — OXYBUTYNIN CHLORIDE 5 MG/1
5 TABLET, EXTENDED RELEASE ORAL DAILY
Qty: 30 TABLET | Refills: 6 | Status: SHIPPED | OUTPATIENT
Start: 2024-11-20 | End: 2025-11-20

## 2024-11-20 RX ORDER — PHENAZOPYRIDINE HYDROCHLORIDE 200 MG/1
200 TABLET, FILM COATED ORAL 3 TIMES DAILY PRN
Qty: 30 TABLET | Refills: 0 | Status: SHIPPED | OUTPATIENT
Start: 2024-11-20 | End: 2024-11-30

## 2024-11-20 NOTE — PROGRESS NOTES
[facility] Note  Staff: Irasema Hernandez FNP-C    PCP: MD Neelima    Chief Complaint: UTI Symptoms    Subjective:        HPI: Rebeca Ortega is a 28 y.o. female NEW PATIENT presents today for evaluation of UTI symptoms. She states she has been seen multiple times by UC and PCP for UTI symptoms. She has taken multiple rounds of abx. However, there are no urine cultures available to review in her chart. She states PCP discussed OAB with her today.     We discussed a trial of an OAB medication. We also discussed known bladder irritants. She denies a history of kidney stones. She denies constipation.     Questions asked pt during office visit today:  DTF: every 1-2 hours, NTF: 4-5 x night  Urgency:Yes , incontinence with urgency? No;   DysuriaNo  Gross HematuriaNo      History of Kidney Stones?:  no    Constipation issues?:  no    PVR by bladder scan performed by MA today:  0 mL    REVIEW OF SYSTEMS:  Review of Systems   Constitutional: Negative.  Negative for chills and fever.   Gastrointestinal: Negative.  Negative for abdominal pain, constipation, diarrhea, nausea and vomiting.   Genitourinary:  Positive for frequency and urgency. Negative for dysuria, flank pain and hematuria.   Musculoskeletal: Negative.  Negative for back pain.       PMHx:  History reviewed. No pertinent past medical history.    PSHx:  History reviewed. No pertinent surgical history.    Fam Hx:   malignancies: No , gyn malignancies: No   kidney stones: No     Soc Hx:  , lives in [place]    Allergies:  Patient has no known allergies.    Medications: reviewed     Objective:   There were no vitals filed for this visit.    Physical Exam  Constitutional:       Appearance: Normal appearance.   Pulmonary:      Effort: Pulmonary effort is normal.   Abdominal:      General: There is no distension.      Palpations: Abdomen is soft.      Tenderness: There is no abdominal tenderness. There is no right CVA tenderness or left  CVA tenderness.   Musculoskeletal:         General: Normal range of motion.      Cervical back: Normal range of motion.   Skin:     General: Skin is warm.   Neurological:      Mental Status: She is alert and oriented to person, place, and time.   Psychiatric:         Mood and Affect: Mood normal.         Behavior: Behavior normal.         LABS REVIEW:  UA today:   Color:Clear, Yellow  Spec. Grav.  1.020  PH  7.0  Negative for leukocytes, nitrates, protein, glucose, ketones, urobili, bili, and blood.    Assessment:       1. Urinary urgency    2. Urinary frequency    3. Dysuria          Plan:      CT RSS ordered and scheduled  Discussed conservative measures to control urgency and frequency including avoiding bladder irritants, timed voiding, not postponing voiding, and bowel regimen (as distended bowel has extrinsic compressive effect on bladder. Discussed bladder irritants include coffe (even decaf), tea, alcohol, soda, spicy foods, acidic juices (orange, tomato), vinegar, and artificial sweeteners.  Pyridium prescribed as needed  Oxybutynin 5mg XL prescribed to pt today as trial to see if med improves pt's current LUTS.  Benefits, risks and side affects were thoroughly explained to pt today in office with all questions answered.    F/u per treatment plan    [facility]: active    COCO Moraes

## 2024-11-20 NOTE — PROGRESS NOTES
Chief Complaint  Chief Complaint   Patient presents with    Urinary Tract Infection        History of Present Illness    Ms. Ortega presents today for follow-up of urinary symptoms.    She reports frequent urge to urinate, particularly noticeable in the morning. She experiences difficulty emptying her bladder completely and describes dribbling urine after urination. Despite the frequent urges, she is able to hold her urine when necessary. She denies pain associated with urination but mentions discomfort in the pelvic region. She denies pain in the kidney area.    No bacteria were found in her recent urine culture. The abnormal urine test result was attributed to her menstrual cycle.    She denies having vaginal discharge. She was prescribed Diflucan for potential yeast infection but has not taken it.    She is sexually active and . She engages in unprotected sexual intercourse with her . She denies having children. She reports recent menstruation.      ROS:  General: -fever, -chills, -fatigue, -weight gain, -weight loss  Eyes: -vision changes, -redness, -discharge  ENT: -ear pain, -nasal congestion, -sore throat  Cardiovascular: -chest pain, -palpitations, -lower extremity edema  Respiratory: -cough, -shortness of breath  Gastrointestinal: -abdominal pain, -nausea, -vomiting, -diarrhea, -constipation, -blood in stool  Genitourinary: -dysuria, -hematuria, +frequency, -painful urination  Musculoskeletal: -joint pain, -muscle pain, -back pain  Skin: -rash, -lesion  Neurological: -headache, -dizziness, -numbness, -tingling  Psychiatric: -anxiety, -depression, -sleep difficulty  Female Genitourinary: -vaginal discharge          PAST MEDICAL HISTORY:  History reviewed. No pertinent past medical history.    PAST SURGICAL HISTORY:  History reviewed. No pertinent surgical history.    SOCIAL HISTORY:  Social History     Socioeconomic History    Marital status:    Tobacco Use    Smoking status: Never      Passive exposure: Never    Smokeless tobacco: Never   Substance and Sexual Activity    Alcohol use: Never    Sexual activity: Yes     Birth control/protection: Condom, Other-see comments     Comment: Birth control pill     Social Drivers of Health     Financial Resource Strain: Low Risk  (7/23/2024)    Overall Financial Resource Strain (CARDIA)     Difficulty of Paying Living Expenses: Not hard at all   Food Insecurity: No Food Insecurity (7/23/2024)    Hunger Vital Sign     Worried About Running Out of Food in the Last Year: Never true     Ran Out of Food in the Last Year: Never true   Physical Activity: Sufficiently Active (7/23/2024)    Exercise Vital Sign     Days of Exercise per Week: 5 days     Minutes of Exercise per Session: 30 min   Stress: No Stress Concern Present (7/23/2024)    [employer] [place] [employer] - Occupational Stress Questionnaire     Feeling of Stress : Only a little   Housing Stability: Unknown (7/23/2024)    Housing Stability Vital Sign     Unable to Pay for Housing in the Last Year: No       FAMILY HISTORY:  No family history on file.    ALLERGIES AND MEDICATIONS: updated and reviewed.  Review of patient's allergies indicates:  No Known Allergies  Current Outpatient Medications   Medication Sig Dispense Refill    amoxicillin-clavulanate 875-125mg (AUGMENTIN) 875-125 mg per tablet Take 1 tablet by mouth every 12 (twelve) hours. for 7 days 14 tablet 0    fluconazole (DIFLUCAN) 150 MG Tab Take 1 tablet (150 mg total) by mouth every 72 hours. 2 tablet 0    FLUoxetine 20 MG capsule Take 1 capsule (20 mg total) by mouth once daily. 90 capsule 3    lisdexamfetamine (VYVANSE) 60 MG capsule Take 1 capsule (60 mg total) by mouth every morning. 30 capsule 0    lisdexamfetamine (VYVANSE) 50 MG capsule Take 1 capsule (50 mg total) by mouth every morning. (Patient not taking: Reported on 11/20/2024) 30 capsule 0     No current facility-administered medications for this visit.            Physical Exam    General: No acute distress. Well-developed. Well-nourished.  Head: Normocephalic. Atraumatic.  Eyes: EOMI. PERRLA. Conjunctivae non-injected. Sclerae anicteric.  Ears: Bilateral EACs clear. Bilateral TMs clear and intact.  Nose: Nares patent. Normal turbinates. No nasal discharge.  Mouth: Moist oral mucosa. Normal dentition.  Throat: No erythema. No exudate. Uvula midline.  Neck: Supple. Full ROM. Non-tender. No JVD. No carotid bruits. No thyromegaly. No lymphadenopathy.  Cardiovascular: Regular rate. Regular rhythm. No murmurs. No rubs. No gallops. Normal S1, S2. Peripheral pulses 2+ and symmetric.  Respiratory: Normal respiratory effort. Clear to auscultation bilaterally. No rales. No rhonchi. No wheezing.  Abdomen: Soft. Non-tender. Non-distended. Normal bowel sounds. Negative McBurney's. Negative Brown's. No rebound. No guarding. No hepatosplenomegaly. No masses.  Musculoskeletal: No  obvious deformity. Normal muscle development. Normal muscle tone. FROM at all joints. No swelling. No tenderness.  Back: No CVA tenderness. No spinal deformity.  Extremities: No cyanosis. No clubbing. No upper extremity edema. No lower extremity edema.  Neurological: Alert & oriented x3. CN II-XII intact. Reflexes 2+ throughout. Strength 5/5 in all extremities. Sensation intact. No slurred speech. Normal gait.  Psychiatric: Normal mood. Normal affect. Good insight. Good judgment. No evidence of suicidal ideation. No evidence of homicidal ideation.  Skin: Warm. Dry. Intact. No rash. No ulcers. No suspicious lesions.        Vitals:    11/20/24 0729   BP: 122/68   Patient Position: Sitting   Pulse: 79   Resp: 18   Temp: 98.7 °F (37.1 °C)   SpO2: 99%   Weight: 89 kg (196 lb 3.4 oz)   Height: 6' (1.829 m)    Body mass index is 26.61 kg/m².  Weight: 89 kg (196 lb 3.4 oz)   Height: 6' (182.9 cm)       Health Maintenance         Date Due Completion Date    TETANUS VACCINE Never done ---    Influenza Vaccine (1)  Never done ---    COVID-19 Vaccine (3 - 2024-25 season) 09/01/2024 12/19/2021    Pap Smear 05/11/2025 5/11/2022    RSV Vaccine (Age 60+ and Pregnant patients) (1 - 1-dose 75+ series) 12/30/2069 ---            Assessment & Plan      IMPRESSION:  -Considered overactive bladder due to urinary frequency and urgency  -Ruled out UTI based on negative culture results  -Evaluated for possible urethritis, considering long-term antibiotic treatment  -Assessed for potential pelvic floor weakness, which could lead to urinary stasis and subsequent UTI  -Considered allergic reaction to partner's hygiene products as potential cause    OVERACTIVE BLADDER:  - Discussed symptoms of overactive bladder and urethritis.    URETHRITIS:  - Recommend investigating partner's hygiene products for potential allergens.  - May start Doxycycline for long-term treatment of urethritis, pending urology evaluation.    URINARY TRACT HEALTH:  - Explained potential link between weak pelvic floor, urinary stasis, and UTIs.    FOLLOW-UP:  - Contact the office if unable to schedule urology appointment today.        Problem List Items Addressed This Visit    None  Visit Diagnoses       Dribbling following urination    -  Primary    Relevant Orders    Ambulatory referral/consult to Urology    Urinalysis, Reflex to Urine Culture Urine, Clean Catch    Mycoplasma genitalium Molecular Detection, PCR Urine    Urinary frequency        Relevant Orders    Ambulatory referral/consult to Urology    Urinalysis, Reflex to Urine Culture Urine, Clean Catch    Mycoplasma genitalium Molecular Detection, PCR Urine    Urinary urgency        Relevant Orders    Ambulatory referral/consult to Urology    Urinalysis, Reflex to Urine Culture Urine, Clean Catch    Mycoplasma genitalium Molecular Detection, PCR Urine    Pelvic fullness        Relevant Orders    Ambulatory referral/consult to Gynecology              Healthcare Maintenance: Deferred at this time.       17+ minutes of  total time spent on the encounter, which includes face to face time and non-face to face time preparing to see the patient (eg, review of tests), Obtaining and/or reviewing separately obtained history, documenting clinical information in the electronic or other health record, independently interpreting results (not separately reported) and communicating results to the patient/family/caregiver, or Care coordination (not separately reported). Visit today included increased complexity associated with the care of the episodic problem addressed and managing the longitudinal care of the patient due to the serious and/or complex managed problem(s).        Sincerely,  Akhil Gunter NP

## 2024-11-22 LAB
M GENITALIUM DNA UR QL NAA+PROBE: NEGATIVE
SPECIMEN SOURCE: NORMAL

## 2024-12-15 DIAGNOSIS — F90.9 ATTENTION DEFICIT HYPERACTIVITY DISORDER (ADHD), UNSPECIFIED ADHD TYPE: ICD-10-CM

## 2024-12-15 NOTE — TELEPHONE ENCOUNTER
No care due was identified.  Health [facility] Embedded Care Due Messages. Reference number: [id number].   12/15/2024 8:02:50 AM CST

## 2024-12-16 RX ORDER — LISDEXAMFETAMINE DIMESYLATE 60 MG/1
60 CAPSULE ORAL EVERY MORNING
Qty: 30 CAPSULE | Refills: 0 | Status: SHIPPED | OUTPATIENT
Start: 2024-12-16

## 2025-01-25 DIAGNOSIS — F90.9 ATTENTION DEFICIT HYPERACTIVITY DISORDER (ADHD), UNSPECIFIED ADHD TYPE: ICD-10-CM

## 2025-01-26 NOTE — TELEPHONE ENCOUNTER
No care due was identified.  Health [facility] Embedded Care Due Messages. Reference number: [id number].   1/25/2025 6:08:02 PM CST

## 2025-01-27 ENCOUNTER — PATIENT MESSAGE (OUTPATIENT)
Dept: PRIMARY CARE CLINIC | Facility: CLINIC | Age: 30
End: 2025-01-27
Payer: COMMERCIAL

## 2025-01-27 DIAGNOSIS — F90.9 ATTENTION DEFICIT HYPERACTIVITY DISORDER (ADHD), UNSPECIFIED ADHD TYPE: ICD-10-CM

## 2025-01-27 RX ORDER — LISDEXAMFETAMINE DIMESYLATE 60 MG/1
60 CAPSULE ORAL EVERY MORNING
Qty: 30 CAPSULE | Refills: 0 | OUTPATIENT
Start: 2025-01-27

## 2025-01-27 RX ORDER — LISDEXAMFETAMINE DIMESYLATE 60 MG/1
60 CAPSULE ORAL EVERY MORNING
Qty: 30 CAPSULE | Refills: 0 | Status: CANCELLED | OUTPATIENT
Start: 2025-01-27

## 2025-01-27 NOTE — TELEPHONE ENCOUNTER
No care due was identified.  [facility] Embedded Care Due Messages. Reference number: [id number].   1/27/2025 5:59:06 PM CST

## 2025-01-28 ENCOUNTER — OFFICE VISIT (OUTPATIENT)
Dept: PRIMARY CARE CLINIC | Facility: CLINIC | Age: 30
End: 2025-01-28
Payer: COMMERCIAL

## 2025-01-28 DIAGNOSIS — F90.9 ATTENTION DEFICIT HYPERACTIVITY DISORDER (ADHD), UNSPECIFIED ADHD TYPE: Primary | ICD-10-CM

## 2025-01-28 DIAGNOSIS — N32.81 OAB (OVERACTIVE BLADDER): ICD-10-CM

## 2025-01-28 DIAGNOSIS — F41.1 GENERALIZED ANXIETY DISORDER: ICD-10-CM

## 2025-01-28 RX ORDER — LISDEXAMFETAMINE DIMESYLATE 60 MG/1
60 CAPSULE ORAL EVERY MORNING
Qty: 30 CAPSULE | Refills: 0 | Status: SHIPPED | OUTPATIENT
Start: 2025-02-27

## 2025-01-28 RX ORDER — LISDEXAMFETAMINE DIMESYLATE 60 MG/1
60 CAPSULE ORAL EVERY MORNING
Qty: 30 CAPSULE | Refills: 0 | Status: SHIPPED | OUTPATIENT
Start: 2025-03-29

## 2025-01-28 RX ORDER — LISDEXAMFETAMINE DIMESYLATE 60 MG/1
60 CAPSULE ORAL EVERY MORNING
Qty: 30 CAPSULE | Refills: 0 | Status: SHIPPED | OUTPATIENT
Start: 2025-01-28

## 2025-01-28 NOTE — PROGRESS NOTES
Patient ID: Rebeca Ortega is a 29 y.o. female.    Chief Complaint: No chief complaint on file.    History of Present Illness    Ms. Ortega presents today for ADHD medication refill.    Stable on Vyvanse 60 mg daily. Denies side effects and/or adverse reactions. Maintains an adequate wake sleep cycle and balanced diet. No blood pressure to report at this time. Nurse visit for blood pressure screening in four days.     She was diagnosed with overactive bladder by a urologist following a urinary tract infection in August that resolved with treatment.    She is currently taking fluoxetine (Prozac) for NANDA and Ditropan for overactive bladder.      ROS:  General: -fever, -chills, -fatigue, -weight gain, -weight loss  Eyes: -vision changes, -redness, -discharge  ENT: -ear pain, -nasal congestion, -sore throat  Cardiovascular: -chest pain, -palpitations, -lower extremity edema  Respiratory: -cough, -shortness of breath  Gastrointestinal: -abdominal pain, -nausea, -vomiting, -diarrhea, -constipation, -blood in stool  Genitourinary: -dysuria, -hematuria, -frequency  Musculoskeletal: -joint pain, -muscle pain  Skin: -rash, -lesion  Neurological: -headache, -dizziness, -numbness, -tingling  Psychiatric: -anxiety, -depression, -sleep difficulty         Wt Readings from Last 10 Encounters:   11/20/24 85.8 kg (189 lb 2.5 oz)   11/20/24 89 kg (196 lb 3.4 oz)   11/15/24 88.1 kg (194 lb 3.6 oz)   08/27/24 87 kg (191 lb 12.8 oz)   07/26/24 88 kg (194 lb)   12/20/23 88.3 kg (194 lb 10.7 oz)   12/16/23 88.3 kg (194 lb 8.9 oz)   07/25/23 87 kg (191 lb 12.8 oz)   05/16/23 85.5 kg (188 lb 7.9 oz)   02/28/23 82.6 kg (182 lb)       The ASCVD Risk score (Guillermo NUNO, et al., 2019) failed to calculate for the following reasons:    The 2019 ASCVD risk score is only valid for ages 40 to 79    PAST MEDICAL HISTORY:  History reviewed. No pertinent past medical history.    PAST SURGICAL HISTORY:  History reviewed. No pertinent surgical  history.    SOCIAL HISTORY:  Social History     Socioeconomic History    Marital status:    Tobacco Use    Smoking status: Never     Passive exposure: Never    Smokeless tobacco: Never   Substance and Sexual Activity    Alcohol use: Never    Sexual activity: Yes     Birth control/protection: Condom, Other-see comments     Comment: Birth control pill     Social Drivers of Health     Financial Resource Strain: Low Risk  (7/23/2024)    Overall Financial Resource Strain (CARDIA)     Difficulty of Paying Living Expenses: Not hard at all   Food Insecurity: No Food Insecurity (7/23/2024)    Hunger Vital Sign     Worried About Running Out of Food in the Last Year: Never true     Ran Out of Food in the Last Year: Never true   Physical Activity: Sufficiently Active (7/23/2024)    Exercise Vital Sign     Days of Exercise per Week: 5 days     Minutes of Exercise per Session: 30 min   Stress: No Stress Concern Present (7/23/2024)    [employer] [employer] - Occupational Stress Questionnaire     Feeling of Stress : Only a little   Housing Stability: Unknown (7/23/2024)    Housing Stability Vital Sign     Unable to Pay for Housing in the Last Year: No       FAMILY HISTORY:  No family history on file.    ALLERGIES AND MEDICATIONS: updated and reviewed.  Review of patient's allergies indicates:  No Known Allergies  Current Outpatient Medications   Medication Sig Dispense Refill    FLUoxetine 20 MG capsule Take 1 capsule (20 mg total) by mouth once daily. 90 capsule 3    lisdexamfetamine (VYVANSE) 60 MG capsule Take 1 capsule (60 mg total) by mouth every morning. 30 capsule 0    [START ON 2/27/2025] lisdexamfetamine (VYVANSE) 60 MG capsule Take 1 capsule (60 mg total) by mouth every morning. 30 capsule 0    [START ON 3/29/2025] lisdexamfetamine (VYVANSE) 60 MG capsule Take 1 capsule (60 mg total) by mouth every morning. 30 capsule 0    oxybutynin (DITROPAN-XL) 5 MG TR24 Take 1 tablet (5 mg total) by mouth  once daily. 30 tablet 6     No current facility-administered medications for this visit.         Physical Exam  Constitutional:       Appearance: Normal appearance.   HENT:      Head: Normocephalic and atraumatic.   Pulmonary:      Effort: Pulmonary effort is normal.   Neurological:      Mental Status: She is alert and oriented to person, place, and time.   Psychiatric:         Mood and Affect: Mood normal.          Assessment:   LABS:   Lab Results   Component Value Date    HGBA1C 4.7 11/16/2021      Lab Results   Component Value Date    CHOL 166 08/09/2024    CHOL 156 07/17/2023    CHOL 165 11/16/2021     Lab Results   Component Value Date    LDLCALC 102.4 08/09/2024    LDLCALC 92.4 07/17/2023    LDLCALC 83.4 11/16/2021     Lab Results   Component Value Date    WBC 5.07 08/09/2024    HGB 13.9 08/09/2024    HCT 41.8 08/09/2024     08/09/2024    CHOL 166 08/09/2024    TRIG 43 08/09/2024    HDL 55 08/09/2024    ALT 13 08/09/2024    AST 16 08/09/2024     08/09/2024    K 4.2 08/09/2024     (H) 08/09/2024    CREATININE 0.8 08/09/2024    BUN 17 08/09/2024    CO2 23 08/09/2024    TSH 1.364 08/09/2024    HGBA1C 4.7 11/16/2021       Plan:   Diagnoses and all orders for this visit:    Attention deficit hyperactivity disorder (ADHD), unspecified ADHD type  -     lisdexamfetamine (VYVANSE) 60 MG capsule; Take 1 capsule (60 mg total) by mouth every morning.  -     lisdexamfetamine (VYVANSE) 60 MG capsule; Take 1 capsule (60 mg total) by mouth every morning.  -     lisdexamfetamine (VYVANSE) 60 MG capsule; Take 1 capsule (60 mg total) by mouth every morning.    Generalized anxiety disorder  -     lisdexamfetamine (VYVANSE) 60 MG capsule; Take 1 capsule (60 mg total) by mouth every morning.  -     lisdexamfetamine (VYVANSE) 60 MG capsule; Take 1 capsule (60 mg total) by mouth every morning.  -     lisdexamfetamine (VYVANSE) 60 MG capsule; Take 1 capsule (60 mg total) by mouth every morning.    OAB (overactive  bladder)        Assessment & Plan      IMPRESSION:  -Reviewed ADHD medication regimen and determined refill was due  -Evaluated need for blood pressure monitoring due to stimulant medication  -Assessed resolved UTI and diagnosis of overactive bladder    ATTENTION DEFICIT HYPERACTIVITY DISORDER (ADHD):  - Refilled ADHD medication with 3 copies: current fill, February 27th fill, and March 29th fill.  - Noted that the patient is about 1 week behind on their prescription.    BLOOD PRESSURE:  - Emphasized the importance of blood pressure monitoring while on stimulant medications.  - Discussed the silent nature of elevated blood pressure and potential for misattributed symptoms.  - Instructed the patient to obtain blood pressure measurement at the clinic.  - Directed clinic staff to contact the patient to schedule a nurse visit for blood pressure check, preferably on Friday.    DEPRESSION:  - Continued fluoxetine (Prozac) prescription.  - Confirmed that the patient is still taking fluoxetine (Prozac).  - Noted that the existing prescription for fluoxetine (Prozac) does not need refilling at this time.    OVERACTIVE BLADDER:  - Continued Ditropan prescription for overactive bladder.  - Noted that the patient was diagnosed with overactive bladder by a urologist following a urinary tract infection.    FOLLOW UP:  - Scheduled a virtual visit with Dr. Ortez in 3 months for follow-up for ADHD/Medication refill.          Each patient to whom he or she provides medical services by telemedicine is:  (1) informed of the relationship between the health care provider and patient and the respective role of any other health care provider with respect to management of the patient; and (2) notified that he or she may decline to receive medical services by telemedicine and may withdraw from such care at any time.    I spent a total of 13+ minutes face to face and non-face to face on the date of this visit.This includes time preparing to  see the patient (eg, review of tests, notes), obtaining and/or reviewing additional history from an independent historian and/or outside medical records, documenting clinical information in the electronic health record, independently interpreting results and/or communicating results to the patient/family/caregiver, or care coordinator.  Visit today included increased complexity associated with the care of the episodic problem addressed and managing the longitudinal care of the patient due to the serious and/or complex managed problem(s).      This note was generated with the assistance of ambient listening technology. Verbal consent was obtained by the patient and accompanying visitor(s) for the recording of patient appointment to facilitate this note. I attest to having reviewed and edited the generated note for accuracy, though some syntax or spelling errors may persist. Please contact the author of this note for any clarification.      Sincerely,  Akhil Gunter NP   Answers submitted by the patient for this visit:  Review of Systems Questionnaire (Submitted on 1/28/2025)  activity change: No  unexpected weight change: No  neck pain: No  hearing loss: No  rhinorrhea: No  trouble swallowing: No  eye discharge: No  visual disturbance: No  chest tightness: No  wheezing: No  chest pain: No  palpitations: No  blood in stool: No  constipation: No  vomiting: No  diarrhea: No  polydipsia: No  polyuria: No  difficulty urinating: No  hematuria: No  menstrual problem: No  dysuria: No  joint swelling: No  arthralgias: No  headaches: No  weakness: No  confusion: No  dysphoric mood: No

## 2025-01-29 VITALS — SYSTOLIC BLOOD PRESSURE: 119 MMHG | DIASTOLIC BLOOD PRESSURE: 71 MMHG

## 2025-02-17 ENCOUNTER — TELEPHONE (OUTPATIENT)
Dept: OBSTETRICS AND GYNECOLOGY | Facility: CLINIC | Age: 30
End: 2025-02-17
Payer: COMMERCIAL

## 2025-02-17 NOTE — TELEPHONE ENCOUNTER
Lvm for pt to return call to get appointment scheduled with our department.   Received referral for Pelvic fullness.   If pt returns call to schedule appt please link referral to appt.

## 2025-03-05 ENCOUNTER — OFFICE VISIT (OUTPATIENT)
Dept: OBSTETRICS AND GYNECOLOGY | Facility: CLINIC | Age: 30
End: 2025-03-05
Payer: COMMERCIAL

## 2025-03-05 VITALS
SYSTOLIC BLOOD PRESSURE: 122 MMHG | WEIGHT: 183.19 LBS | BODY MASS INDEX: 24.81 KG/M2 | HEIGHT: 72 IN | DIASTOLIC BLOOD PRESSURE: 80 MMHG

## 2025-03-05 DIAGNOSIS — Z12.4 CERVICAL CANCER SCREENING: ICD-10-CM

## 2025-03-05 DIAGNOSIS — F90.9 ATTENTION DEFICIT HYPERACTIVITY DISORDER (ADHD), UNSPECIFIED ADHD TYPE: ICD-10-CM

## 2025-03-05 DIAGNOSIS — Z32.01 PREGNANCY EXAMINATION OR TEST, POSITIVE RESULT: Primary | ICD-10-CM

## 2025-03-05 DIAGNOSIS — N91.2 AMENORRHEA: ICD-10-CM

## 2025-03-05 LAB
B-HCG UR QL: POSITIVE
BILIRUB UR QL STRIP: NEGATIVE
CLARITY UR: ABNORMAL
COLOR UR: YELLOW
CTP QC/QA: YES
GLUCOSE UR QL STRIP: NEGATIVE
HGB UR QL STRIP: NEGATIVE
KETONES UR QL STRIP: ABNORMAL
LEUKOCYTE ESTERASE UR QL STRIP: NEGATIVE
NITRITE UR QL STRIP: NEGATIVE
PH UR STRIP: 6 [PH] (ref 5–8)
PROT UR QL STRIP: NEGATIVE
SP GR UR STRIP: >=1.03 (ref 1–1.03)
URN SPEC COLLECT METH UR: ABNORMAL

## 2025-03-05 PROCEDURE — 81025 URINE PREGNANCY TEST: CPT | Mod: S$GLB,,, | Performed by: OBSTETRICS & GYNECOLOGY

## 2025-03-05 PROCEDURE — 81003 URINALYSIS AUTO W/O SCOPE: CPT | Performed by: OBSTETRICS & GYNECOLOGY

## 2025-03-05 PROCEDURE — 99999 PR PBB SHADOW E&M-EST. PATIENT-LVL III: CPT | Mod: PBBFAC,,, | Performed by: OBSTETRICS & GYNECOLOGY

## 2025-03-05 PROCEDURE — 88175 CYTOPATH C/V AUTO FLUID REDO: CPT | Performed by: OBSTETRICS & GYNECOLOGY

## 2025-03-05 PROCEDURE — 87086 URINE CULTURE/COLONY COUNT: CPT | Performed by: OBSTETRICS & GYNECOLOGY

## 2025-03-05 PROCEDURE — 87591 N.GONORRHOEAE DNA AMP PROB: CPT | Performed by: OBSTETRICS & GYNECOLOGY

## 2025-03-05 PROCEDURE — 99204 OFFICE O/P NEW MOD 45 MIN: CPT | Mod: S$GLB,,, | Performed by: OBSTETRICS & GYNECOLOGY

## 2025-03-05 NOTE — Clinical Note
Hey guys, Just met this patient today.  She is pregnant (only about 4 weeks).  I told her to continue her prozac.  I told her I'd reach out to you gaetano regarding her Vyvanse.  I don't recommend stopping this cold turkey, but did advise her to consider lowering her dose gradually with the hope that she might be able to stop it during the pregnancy.  I just don't want her to get to a point where her ADHD is uncontrolled and she's not able to focus at work or behind the wheel.  She works with large animals at the [employer], and if unable to focus, I worry she might get injured.  I don't prescribe Vyvanse, but maybe y'all can gradually drop her dose by 10mg each time and just see how she does on it.

## 2025-03-05 NOTE — PROGRESS NOTES
Subjective     Patient ID: Rebeca Ortega is a 29 y.o. female.    Chief Complaint:  Amenorrhea      History of Present Illness  HPI  Presents for pregnancy confirmation visit.  LMP was 2/3/25, and had a positive home UPT yesterday.  Pt feels great.  No pain or VB.  No N/V.  Pregnancy was not planned, but they are excited.  Wanted some clarification about her job requirements and safety, and also about her prozac and vyvanse.  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  Would like a note stating what she can and cannot safely do with this particular job in pregnancy.  Also, pt has ADHD and anxiety.  Is doing really well on prozac and vyvanse 60mg.  Last pap: 22: neg    GYN & OB History  Patient's last menstrual period was 2025.   Date of Last Pap: 3/5/2025    OB History    Para Term  AB Living   0 0 0 0 0 0   SAB IAB Ectopic Multiple Live Births   0 0 0 0 0       Review of Systems  Review of Systems       Objective   Physical Exam:   Constitutional: She is oriented to person, place, and time. She appears well-developed and well-nourished. No distress.             Abdominal: Soft. She exhibits no distension and no mass. There is no abdominal tenderness. There is no rebound and no guarding. Hernia confirmed negative in the right inguinal area and confirmed negative in the left inguinal area.     Genitourinary:    Vagina normal.      Pelvic exam was performed with patient supine.   There is no rash, tenderness, lesion or injury on the right labia. There is no rash, tenderness, lesion or injury on the left labia. Right adnexum displays no mass, no tenderness and no fullness. Left adnexum displays no mass, no tenderness and no fullness. No erythema, vaginal discharge, tenderness, bleeding, rectocele, cystocele or prolapse of vaginal walls in the vagina.    No foreign body in the vagina.      No  "signs of injury in the vagina.   Cervix exhibits no motion tenderness, no discharge and no friability. Uterus is not deviated, not enlarged, not fixed and not tender.               Neurological: She is alert and oriented to person, place, and time.     Psychiatric: She has a normal mood and affect.            Assessment and Plan     1. Pregnancy examination or test, positive result    2. Amenorrhea    3. Attention deficit hyperactivity disorder (ADHD), unspecified ADHD type    4. Cervical cancer screening             Plan:  Rebeca was seen today for amenorrhea.    Diagnoses and all orders for this visit:    Pregnancy examination or test, positive result  -     C. trachomatis/N. gonorrhoeae by AMP DNA  -     CBC Auto Differential; Future  -     Hepatitis B Surface Antigen; Future  -     HIV 1/2 Ag/Ab (4th Gen); Future  -     Rubella Antibody, IgG; Future  -     Treponema Pallidium Antibodies IgG, IgM; Future  -     Type & Screen - Ob Profile; Future  -     Hepatitis C Antibody; Future  -     US OB/GYN Procedure ([facility]); Future  -     Urinalysis  -     Urine Culture High Risk    Amenorrhea  -     POCT Urine Pregnancy    Attention deficit hyperactivity disorder (ADHD), unspecified ADHD type    Cervical cancer screening  -     Liquid-Based Pap Smear, Screening  -     HPV High Risk Genotypes, PCR     Advised the following regarding her job and wrote a note:  -Do not handle any teratogenic meds  -Wear gloves when handling animals, excrement, medications, etc.. and wash hands after each encounter  -don't change litter boxes (pt does not do that)  -for x-ray: wear lead that wraps completely around her abdomen and back; stand at least 6 feet away during x-ray; wear radiation dosimeter for herself and a separate one for the fetus  -avoid handling violent animals/emergencies    Advised pt to continue prozac.  Don't stop vyvanse "cold turkey".  Will communicate with her PCP to help wean her dose with the hope of " discontinuing it during the pregnancy.  If pt cannot function off vyvanse, then okay to continue.  If inattentiveness causes problems focusing while driving or while at work, then it is more beneficial for her to continue vyvanse rather than stop altogether.    The patient was counseled on common complaints of pregnancy.  She was given bleeding and pain precautions.  I oriented the patient to the collaborative CNM/physician practice, and all questions were answered.    RTC 1 month with CNM for initial OB visit and dating US

## 2025-03-05 NOTE — LETTER
March 5, 2025    Rebeca Ortega  [address] [facility] [address]             [facility] 2nd [facility]  [address]  [place] [address]  Phone: [phone]  Fax: [phone] To whom this may concern, Rebeca is currently under our care for pregnancy. Please allow for Rebeca to not handle any teratogenic medications, will need wear a lead that covers front to back, need a radiation dosimeter for Rebeca and fetus, will need to stand at least 6 feet away from x-ray machine/radiations and also will need to avoid handling violent animals/emergencies. If you have any questions or concerns please do not hesitate to contact our office.       Thank You,    Yumiko Gan MD/ [facility] MA

## 2025-03-06 ENCOUNTER — RESULTS FOLLOW-UP (OUTPATIENT)
Dept: OBSTETRICS AND GYNECOLOGY | Facility: HOSPITAL | Age: 30
End: 2025-03-06

## 2025-03-06 DIAGNOSIS — Z32.01 PREGNANCY EXAMINATION OR TEST, POSITIVE RESULT: Primary | ICD-10-CM

## 2025-03-06 LAB
C TRACH DNA SPEC QL NAA+PROBE: NOT DETECTED
N GONORRHOEA DNA SPEC QL NAA+PROBE: NOT DETECTED

## 2025-03-07 LAB
BACTERIA UR CULT: NORMAL
BACTERIA UR CULT: NORMAL
FINAL PATHOLOGIC DIAGNOSIS: NORMAL
Lab: NORMAL

## 2025-03-08 ENCOUNTER — LAB VISIT (OUTPATIENT)
Dept: LAB | Facility: HOSPITAL | Age: 30
End: 2025-03-08
Attending: OBSTETRICS & GYNECOLOGY
Payer: COMMERCIAL

## 2025-03-08 DIAGNOSIS — Z32.01 PREGNANCY EXAMINATION OR TEST, POSITIVE RESULT: ICD-10-CM

## 2025-03-08 LAB
BASOPHILS # BLD AUTO: 0.04 K/UL (ref 0–0.2)
BASOPHILS NFR BLD: 0.7 % (ref 0–1.9)
DIFFERENTIAL METHOD BLD: ABNORMAL
EOSINOPHIL # BLD AUTO: 0.1 K/UL (ref 0–0.5)
EOSINOPHIL NFR BLD: 1.7 % (ref 0–8)
ERYTHROCYTE [DISTWIDTH] IN BLOOD BY AUTOMATED COUNT: 11.3 % (ref 11.5–14.5)
HBV SURFACE AG SERPL QL IA: NORMAL
HCT VFR BLD AUTO: 41.4 % (ref 37–48.5)
HCV AB SERPL QL IA: NORMAL
HGB BLD-MCNC: 14 G/DL (ref 12–16)
HIV 1+2 AB+HIV1 P24 AG SERPL QL IA: NORMAL
IMM GRANULOCYTES # BLD AUTO: 0.01 K/UL (ref 0–0.04)
IMM GRANULOCYTES NFR BLD AUTO: 0.2 % (ref 0–0.5)
LYMPHOCYTES # BLD AUTO: 1.5 K/UL (ref 1–4.8)
LYMPHOCYTES NFR BLD: 28.7 % (ref 18–48)
MCH RBC QN AUTO: 33.1 PG (ref 27–31)
MCHC RBC AUTO-ENTMCNC: 33.8 G/DL (ref 32–36)
MCV RBC AUTO: 98 FL (ref 82–98)
MONOCYTES # BLD AUTO: 0.5 K/UL (ref 0.3–1)
MONOCYTES NFR BLD: 8.8 % (ref 4–15)
NEUTROPHILS # BLD AUTO: 3.2 K/UL (ref 1.8–7.7)
NEUTROPHILS NFR BLD: 59.9 % (ref 38–73)
NRBC BLD-RTO: 0 /100 WBC
PLATELET # BLD AUTO: 236 K/UL (ref 150–450)
PMV BLD AUTO: 12.4 FL (ref 9.2–12.9)
RBC # BLD AUTO: 4.23 M/UL (ref 4–5.4)
TREPONEMA PALLIDUM IGG+IGM AB [PRESENCE] IN SERUM OR PLASMA BY IMMUNOASSAY: NONREACTIVE
WBC # BLD AUTO: 5.37 K/UL (ref 3.9–12.7)

## 2025-03-08 PROCEDURE — 86593 SYPHILIS TEST NON-TREP QUANT: CPT | Performed by: OBSTETRICS & GYNECOLOGY

## 2025-03-08 PROCEDURE — 86803 HEPATITIS C AB TEST: CPT | Performed by: OBSTETRICS & GYNECOLOGY

## 2025-03-08 PROCEDURE — 85025 COMPLETE CBC W/AUTO DIFF WBC: CPT | Performed by: OBSTETRICS & GYNECOLOGY

## 2025-03-08 PROCEDURE — 36415 COLL VENOUS BLD VENIPUNCTURE: CPT | Performed by: OBSTETRICS & GYNECOLOGY

## 2025-03-08 PROCEDURE — 87389 HIV-1 AG W/HIV-1&-2 AB AG IA: CPT | Performed by: OBSTETRICS & GYNECOLOGY

## 2025-03-08 PROCEDURE — 86762 RUBELLA ANTIBODY: CPT | Performed by: OBSTETRICS & GYNECOLOGY

## 2025-03-08 PROCEDURE — 87340 HEPATITIS B SURFACE AG IA: CPT | Performed by: OBSTETRICS & GYNECOLOGY

## 2025-03-10 LAB
RUBV IGG SER-ACNC: 177 IU/ML
RUBV IGG SER-IMP: REACTIVE

## 2025-03-10 NOTE — PROGRESS NOTES
Phoned patient, unsuccessful, no reply, left voice message to return call and what call is regarding, asked to return call to clinic at [phone].

## 2025-03-11 ENCOUNTER — OFFICE VISIT (OUTPATIENT)
Dept: PRIMARY CARE CLINIC | Facility: CLINIC | Age: 30
End: 2025-03-11
Payer: COMMERCIAL

## 2025-03-11 VITALS
HEART RATE: 71 BPM | OXYGEN SATURATION: 98 % | TEMPERATURE: 96 F | BODY MASS INDEX: 24.82 KG/M2 | WEIGHT: 183 LBS | SYSTOLIC BLOOD PRESSURE: 120 MMHG | DIASTOLIC BLOOD PRESSURE: 86 MMHG

## 2025-03-11 DIAGNOSIS — Z3A.01 LESS THAN 8 WEEKS GESTATION OF PREGNANCY: ICD-10-CM

## 2025-03-11 DIAGNOSIS — F41.1 GENERALIZED ANXIETY DISORDER: ICD-10-CM

## 2025-03-11 DIAGNOSIS — F90.9 ATTENTION DEFICIT HYPERACTIVITY DISORDER (ADHD), UNSPECIFIED ADHD TYPE: ICD-10-CM

## 2025-03-11 DIAGNOSIS — Z32.01 PREGNANCY EXAMINATION OR TEST, POSITIVE RESULT: Primary | ICD-10-CM

## 2025-03-11 PROCEDURE — 99999 PR PBB SHADOW E&M-EST. PATIENT-LVL III: CPT | Mod: PBBFAC,,, | Performed by: NURSE PRACTITIONER

## 2025-03-11 PROCEDURE — 99213 OFFICE O/P EST LOW 20 MIN: CPT | Mod: S$GLB,,, | Performed by: NURSE PRACTITIONER

## 2025-03-11 RX ORDER — LISDEXAMFETAMINE DIMESYLATE 50 MG/1
50 CAPSULE ORAL EVERY MORNING
Qty: 30 CAPSULE | Refills: 0 | Status: SHIPPED | OUTPATIENT
Start: 2025-03-11

## 2025-03-11 NOTE — PROGRESS NOTES
Patient ID: Rebeca Ortega is a 29 y.o. female.    Chief Complaint: Medication Problem    History of Present Illness    Ms. Ortega presents today for medication management during pregnancy    She is currently 5 weeks pregnant with her first pregnancy. First prenatal ultrasound is scheduled for April 2nd.    She continues Prozac for generalized anxiety. She was previously prescribed Vyvanse but discontinued it this week, experiencing significant fatigue and crashing symptoms.    She works as a large animal nurse.    Recent labs were partially completed. Type and screen test requires reordering due to patient reported sample breakage.      ROS:  General: -fever, -chills, +fatigue, -weight gain, -weight loss  Eyes: -vision changes, -redness, -discharge  ENT: -ear pain, -nasal congestion, -sore throat  Cardiovascular: -chest pain, -palpitations, -lower extremity edema  Respiratory: -cough, -shortness of breath  Gastrointestinal: -abdominal pain, -nausea, -vomiting, -diarrhea, -constipation, -blood in stool  Genitourinary: -dysuria, -hematuria, -frequency  Musculoskeletal: -joint pain, -muscle pain  Skin: -rash, -lesion  Neurological: -headache, -dizziness, -numbness, -tingling  Psychiatric: -anxiety, -depression, -sleep difficulty         Wt Readings from Last 10 Encounters:   03/11/25 83 kg (182 lb 15.7 oz)   03/05/25 83.1 kg (183 lb 3.2 oz)   11/20/24 85.8 kg (189 lb 2.5 oz)   11/20/24 89 kg (196 lb 3.4 oz)   11/15/24 88.1 kg (194 lb 3.6 oz)   08/27/24 87 kg (191 lb 12.8 oz)   07/26/24 88 kg (194 lb)   12/20/23 88.3 kg (194 lb 10.7 oz)   12/16/23 88.3 kg (194 lb 8.9 oz)   07/25/23 87 kg (191 lb 12.8 oz)       The ASCVD Risk score (Guillermo DK, et al., 2019) failed to calculate for the following reasons:    The 2019 ASCVD risk score is only valid for ages 40 to 79    PAST MEDICAL HISTORY:  Past Medical History:   Diagnosis Date    ADHD        PAST SURGICAL HISTORY:  Past Surgical History:   Procedure Laterality Date     COLONOSCOPY  2023    large mass removed, was benign       SOCIAL HISTORY:  Social History[1]    FAMILY HISTORY:  Family History   Problem Relation Name Age of Onset    Breast cancer Neg Hx      Colon cancer Neg Hx      Ovarian cancer Neg Hx      Uterine cancer Neg Hx         ALLERGIES AND MEDICATIONS: updated and reviewed.  Review of patient's allergies indicates:  No Known Allergies  Current Medications[2]      Physical Exam  Constitutional:       Appearance: Normal appearance.   HENT:      Head: Normocephalic and atraumatic.      Nose: Nose normal.      Mouth/Throat:      Mouth: Mucous membranes are moist.   Eyes:      Pupils: Pupils are equal, round, and reactive to light.   Cardiovascular:      Rate and Rhythm: Normal rate.      Pulses: Normal pulses.   Pulmonary:      Effort: Pulmonary effort is normal.   Abdominal:      General: Bowel sounds are normal.   Musculoskeletal:         General: Normal range of motion.      Cervical back: Normal range of motion.   Skin:     General: Skin is warm and dry.      Capillary Refill: Capillary refill takes less than 2 seconds.   Neurological:      Mental Status: She is alert and oriented to person, place, and time.   Psychiatric:         Mood and Affect: Mood normal.          Assessment:   LABS:   Lab Results   Component Value Date    HGBA1C 4.7 11/16/2021      Lab Results   Component Value Date    CHOL 166 08/09/2024    CHOL 156 07/17/2023    CHOL 165 11/16/2021     Lab Results   Component Value Date    LDLCALC 102.4 08/09/2024    LDLCALC 92.4 07/17/2023    LDLCALC 83.4 11/16/2021     Lab Results   Component Value Date    WBC 5.37 03/08/2025    HGB 14.0 03/08/2025    HCT 41.4 03/08/2025     03/08/2025    CHOL 166 08/09/2024    TRIG 43 08/09/2024    HDL 55 08/09/2024    ALT 13 08/09/2024    AST 16 08/09/2024     08/09/2024    K 4.2 08/09/2024     (H) 08/09/2024    CREATININE 0.8 08/09/2024    BUN 17 08/09/2024    CO2 23 08/09/2024    TSH 1.364 08/09/2024     HGBA1C 4.7 11/16/2021       Plan:   Rebeca was seen today for medication problem.    Diagnoses and all orders for this visit:    Pregnancy examination or test, positive result    Less than 8 weeks gestation of pregnancy    Attention deficit hyperactivity disorder (ADHD), unspecified ADHD type  -     lisdexamfetamine (VYVANSE) 50 MG capsule; Take 1 capsule (50 mg total) by mouth every morning.    Generalized anxiety disorder  -The current medical regimen is effective;  continue present plan and medications.         Assessment & Plan      IMPRESSION:  -Continued Prozac for generalized anxiety, as it poses no known risk of teratogenicity based on limited human data  -Will gradually decrease Vyvanse dosage by 10mg each month, starting at 50mg, to avoid abrupt discontinuation effects  -Noted incomplete lab results due to sample mishandling, with type and screen test needing to be reordered    GENERALIZED ANXIETY DISORDER:  - Continued Prozac at current dose for generalized anxiety disorder.  - Reviewed the safety profile of Prozac during pregnancy with the patient.  - Will maintain the current Prozac regimen throughout the pregnancy for management of generalized anxiety disorder.    PREGNANCY:  - Evaluated the patient's current pregnancy status, which is less than 8 weeks gestation.    ATTENTION DEFICIT HYPERACTIVITY DISORDER (ADHD):  - Planned to gradually taper Vyvanse dosage by 10mg each month from the current 50mg dose.  - Instructed the patient to continue the current 50mg Vyvanse dosage until the next follow-up.    FOLLOW UP:  - Scheduled monthly virtual follow-ups to monitor medication changes and patient's progress.       I spent a total of 20 minutes face to face and non-face to face on the date of this visit.This includes time preparing to see the patient (eg, review of tests, notes), obtaining and/or reviewing additional history from an independent historian and/or outside medical records, documenting  clinical information in the electronic health record, independently interpreting results and/or communicating results to the patient/family/caregiver, or care coordinator.  Visit today included increased complexity associated with the care of the episodic problem addressed and managing the longitudinal care of the patient due to the serious and/or complex managed problem(s).      This note was generated with the assistance of ambient listening technology. Verbal consent was obtained by the patient and accompanying visitor(s) for the recording of patient appointment to facilitate this note. I attest to having reviewed and edited the generated note for accuracy, though some syntax or spelling errors may persist. Please contact the author of this note for any clarification.        Sincerely,  Akhil Gunter NP          [1]   Social History  Socioeconomic History    Marital status:    Tobacco Use    Smoking status: Never     Passive exposure: Never    Smokeless tobacco: Never   Substance and Sexual Activity    Alcohol use: Never    Drug use: Never    Sexual activity: Yes     Partners: Male     Social Drivers of Health     Financial Resource Strain: Low Risk  (7/23/2024)    Overall Financial Resource Strain (CARDIA)     Difficulty of Paying Living Expenses: Not hard at all   Food Insecurity: No Food Insecurity (7/23/2024)    Hunger Vital Sign     Worried About Running Out of Food in the Last Year: Never true     Ran Out of Food in the Last Year: Never true   Physical Activity: Sufficiently Active (7/23/2024)    Exercise Vital Sign     Days of Exercise per Week: 5 days     Minutes of Exercise per Session: 30 min   Stress: No Stress Concern Present (7/23/2024)    Citizen of [place] [employer] - Occupational Stress Questionnaire     Feeling of Stress : Only a little   Housing Stability: Unknown (7/23/2024)    Housing Stability Vital Sign     Unable to Pay for Housing in the Last Year: No   [2]   Current  Outpatient Medications   Medication Sig Dispense Refill    FLUoxetine 20 MG capsule Take 1 capsule (20 mg total) by mouth once daily. 90 capsule 3    lisdexamfetamine (VYVANSE) 50 MG capsule Take 1 capsule (50 mg total) by mouth every morning. 30 capsule 0     No current facility-administered medications for this visit.

## 2025-03-16 ENCOUNTER — HOSPITAL ENCOUNTER (EMERGENCY)
Facility: HOSPITAL | Age: 30
Discharge: HOME | End: 2025-03-16
Attending: EMERGENCY MEDICINE
Payer: COMMERCIAL

## 2025-03-16 VITALS
OXYGEN SATURATION: 99 % | HEIGHT: 72 IN | SYSTOLIC BLOOD PRESSURE: 130 MMHG | BODY MASS INDEX: 24.97 KG/M2 | RESPIRATION RATE: 16 BRPM | DIASTOLIC BLOOD PRESSURE: 64 MMHG | TEMPERATURE: 99 F | WEIGHT: 184.38 LBS | HEART RATE: 63 BPM

## 2025-03-16 DIAGNOSIS — N93.9 VAGINAL BLEEDING: ICD-10-CM

## 2025-03-16 DIAGNOSIS — Z34.90 INTRAUTERINE PREGNANCY: ICD-10-CM

## 2025-03-16 DIAGNOSIS — O20.0 THREATENED MISCARRIAGE IN EARLY PREGNANCY: Primary | ICD-10-CM

## 2025-03-16 LAB
ABO + RH BLD: NORMAL
ALBUMIN SERPL BCP-MCNC: 4 G/DL (ref 3.5–5.2)
ALP SERPL-CCNC: 38 U/L (ref 40–150)
ALT SERPL W/O P-5'-P-CCNC: 15 U/L (ref 10–44)
ANION GAP SERPL CALC-SCNC: 5 MMOL/L (ref 8–16)
AST SERPL-CCNC: 16 U/L (ref 10–40)
BASOPHILS # BLD AUTO: 0.03 K/UL (ref 0–0.2)
BASOPHILS NFR BLD: 0.6 % (ref 0–1.9)
BILIRUB SERPL-MCNC: 0.3 MG/DL (ref 0.1–1)
BILIRUB UR QL STRIP: NEGATIVE
BLD GP AB SCN CELLS X3 SERPL QL: NORMAL
BUN SERPL-MCNC: 9 MG/DL (ref 6–20)
CALCIUM SERPL-MCNC: 9.1 MG/DL (ref 8.7–10.5)
CHLORIDE SERPL-SCNC: 109 MMOL/L (ref 95–110)
CLARITY UR: CLEAR
CO2 SERPL-SCNC: 21 MMOL/L (ref 23–29)
COLOR UR: YELLOW
CREAT SERPL-MCNC: 0.7 MG/DL (ref 0.5–1.4)
DIFFERENTIAL METHOD BLD: ABNORMAL
EOSINOPHIL # BLD AUTO: 0.1 K/UL (ref 0–0.5)
EOSINOPHIL NFR BLD: 1 % (ref 0–8)
ERYTHROCYTE [DISTWIDTH] IN BLOOD BY AUTOMATED COUNT: 11.3 % (ref 11.5–14.5)
EST. GFR  (NO RACE VARIABLE): >60 ML/MIN/1.73 M^2
GLUCOSE SERPL-MCNC: 80 MG/DL (ref 70–110)
GLUCOSE UR QL STRIP: NEGATIVE
HCG INTACT+B SERPL-ACNC: NORMAL MIU/ML
HCT VFR BLD AUTO: 40.1 % (ref 37–48.5)
HGB BLD-MCNC: 13.8 G/DL (ref 12–16)
HGB UR QL STRIP: NEGATIVE
IMM GRANULOCYTES # BLD AUTO: 0.01 K/UL (ref 0–0.04)
IMM GRANULOCYTES NFR BLD AUTO: 0.2 % (ref 0–0.5)
KETONES UR QL STRIP: NEGATIVE
LEUKOCYTE ESTERASE UR QL STRIP: NEGATIVE
LYMPHOCYTES # BLD AUTO: 1.2 K/UL (ref 1–4.8)
LYMPHOCYTES NFR BLD: 24.1 % (ref 18–48)
MCH RBC QN AUTO: 33.2 PG (ref 27–31)
MCHC RBC AUTO-ENTMCNC: 34.4 G/DL (ref 32–36)
MCV RBC AUTO: 96 FL (ref 82–98)
MONOCYTES # BLD AUTO: 0.3 K/UL (ref 0.3–1)
MONOCYTES NFR BLD: 6.7 % (ref 4–15)
NEUTROPHILS # BLD AUTO: 3.4 K/UL (ref 1.8–7.7)
NEUTROPHILS NFR BLD: 67.4 % (ref 38–73)
NITRITE UR QL STRIP: NEGATIVE
NRBC BLD-RTO: 0 /100 WBC
PH UR STRIP: 7 [PH] (ref 5–8)
PLATELET # BLD AUTO: 181 K/UL (ref 150–450)
PMV BLD AUTO: 11.3 FL (ref 9.2–12.9)
POTASSIUM SERPL-SCNC: 4.2 MMOL/L (ref 3.5–5.1)
PROT SERPL-MCNC: 6.4 G/DL (ref 6–8.4)
PROT UR QL STRIP: NEGATIVE
RBC # BLD AUTO: 4.16 M/UL (ref 4–5.4)
SODIUM SERPL-SCNC: 135 MMOL/L (ref 136–145)
SP GR UR STRIP: 1.02 (ref 1–1.03)
URN SPEC COLLECT METH UR: NORMAL
UROBILINOGEN UR STRIP-ACNC: NEGATIVE EU/DL
WBC # BLD AUTO: 5.07 K/UL (ref 3.9–12.7)

## 2025-03-16 PROCEDURE — 99284 EMERGENCY DEPT VISIT MOD MDM: CPT | Mod: 25

## 2025-03-16 PROCEDURE — 84702 CHORIONIC GONADOTROPIN TEST: CPT | Performed by: EMERGENCY MEDICINE

## 2025-03-16 PROCEDURE — 86901 BLOOD TYPING SEROLOGIC RH(D): CPT | Performed by: EMERGENCY MEDICINE

## 2025-03-16 PROCEDURE — 81003 URINALYSIS AUTO W/O SCOPE: CPT | Performed by: EMERGENCY MEDICINE

## 2025-03-16 PROCEDURE — 85025 COMPLETE CBC W/AUTO DIFF WBC: CPT | Performed by: EMERGENCY MEDICINE

## 2025-03-16 PROCEDURE — 80053 COMPREHEN METABOLIC PANEL: CPT | Performed by: EMERGENCY MEDICINE

## 2025-03-16 NOTE — ED PROVIDER NOTES
SCRIBE #1 NOTE: I, Angie Hair, am scribing for, and in the presence of, Giuseppe Mart Jr., MD. I have scribed the entire note.       History     Chief Complaint   Patient presents with    Vaginal Bleeding     Pt who is  and reports she is 6 weeks pregnant presents to the ER with c/o vaginal bleeding that began this morning. Pt describes the blood as scant and light brown. Denies any recent trauma or falls. LMP 2024     Review of patient's allergies indicates:  No Known Allergies      History of Present Illness     HPI    3/16/2025, 7:39 AM  History obtained from the patient and medical records      History of Present Illness: Rebeca Ortega is a 29 y.o. female patient with a PMHx of ADHD who presents to the Emergency Department for evaluation of vaginal bleeding which began this morning. She describes the bleeding as some brown spotting. Pt notes she is 6 weeks pregnant. Her last menstrual cycle was was around 2025. Symptoms are constant and moderate in severity. No mitigating or exacerbating factors reported. Associated sxs include nausea. Patient denies any fever, chills, difficulty urinating, or dysuria. No prior Tx specified.  No further complaints or concerns at this time.       Arrival mode: Personal Transportation    PCP: Kayley Ortez MD        Past Medical History:  Past Medical History:   Diagnosis Date    ADHD        Past Surgical History:  Past Surgical History:   Procedure Laterality Date    COLONOSCOPY      large mass removed, was benign         Family History:  Family History   Problem Relation Name Age of Onset    Breast cancer Neg Hx      Colon cancer Neg Hx      Ovarian cancer Neg Hx      Uterine cancer Neg Hx         Social History:  Social History     Tobacco Use    Smoking status: Never     Passive exposure: Never    Smokeless tobacco: Never   Substance and Sexual Activity    Alcohol use: Never    Drug use: Never    Sexual activity: Yes     Partners: Male         Review of Systems     Review of Systems   Constitutional:  Negative for chills and fever.   HENT:  Negative for sore throat.    Respiratory:  Negative for shortness of breath.    Cardiovascular:  Negative for chest pain.   Gastrointestinal:  Positive for nausea. Negative for vomiting.   Genitourinary:  Positive for vaginal bleeding (brown spotting). Negative for difficulty urinating and dysuria.   Musculoskeletal:  Negative for back pain.   Skin:  Negative for rash.   Neurological:  Negative for weakness.   Hematological:  Does not bruise/bleed easily.   All other systems reviewed and are negative.     Physical Exam     Initial Vitals [03/16/25 0728]   BP Pulse Resp Temp SpO2   (!) 122/58 82 16 98.6 °F (37 °C) 98 %      MAP       --          Physical Exam  Nursing Notes and Vital Signs Reviewed.  Constitutional: Patient is in no acute distress. Well-developed and well-nourished.  Head: Atraumatic. Normocephalic.  Eyes:  EOM intact.  No scleral icterus.  ENT: Mucous membranes are moist.  Nares clear   Neck:  Full ROM. No JVD.  Cardiovascular: Regular rate. Regular rhythm No murmurs, rubs, or gallops. Distal pulses are 2+ and symmetric  Pulmonary/Chest: No respiratory distress. Clear to auscultation bilaterally. No wheezing or rales.  Equal chest wall rise bilaterally  Abdominal: Soft and non-distended.  There is no tenderness.  No rebound, guarding, or rigidity. Good bowel sounds.  Genitourinary: No CVA tenderness.  No suprapubic tenderness.  Pelvic exam performed his female standby.  Nares that has some rust-colored blood at the cervix.  The cervix is thick high and closed.  That has no pelvic tenderness  Musculoskeletal: Moves all extremities. No obvious deformities.  5 x 5 strength in all extremities   Skin: Warm and dry.  Neurological:  Alert, awake, and appropriate.  Normal speech.  No acute focal neurological deficits are appreciated.  Two through 12 intact bilaterally.  Psychiatric: Normal affect. Good eye  contact. Appropriate in content.     ED Course   Procedures  ED Vital Signs:  Vitals:    03/16/25 0728   BP: (!) 122/58   Pulse: 82   Resp: 16   Temp: 98.6 °F (37 °C)   TempSrc: Oral   SpO2: 98%   Weight: 83.6 kg (184 lb 6.4 oz)   Height: 6' (1.829 m)       Abnormal Lab Results:  Labs Reviewed   COMPREHENSIVE METABOLIC PANEL - Abnormal       Result Value    Sodium 135 (*)     Potassium 4.2      Chloride 109      CO2 21 (*)     Glucose 80      BUN 9      Creatinine 0.7      Calcium 9.1      Total Protein 6.4      Albumin 4.0      Total Bilirubin 0.3      Alkaline Phosphatase 38 (*)     AST 16      ALT 15      eGFR >60      Anion Gap 5 (*)     Narrative:     Release to patient->Immediate   CBC W/ AUTO DIFFERENTIAL - Abnormal    WBC 5.07      RBC 4.16      Hemoglobin 13.8      Hematocrit 40.1      MCV 96      MCH 33.2 (*)     MCHC 34.4      RDW 11.3 (*)     Platelets 181      MPV 11.3      Immature Granulocytes 0.2      Gran # (ANC) 3.4      Immature Grans (Abs) 0.01      Lymph # 1.2      Mono # 0.3      Eos # 0.1      Baso # 0.03      nRBC 0      Gran % 67.4      Lymph % 24.1      Mono % 6.7      Eosinophil % 1.0      Basophil % 0.6      Differential Method Automated      Narrative:     Release to patient->Immediate   URINALYSIS, REFLEX TO URINE CULTURE    Specimen UA Urine, Clean Catch      Color, UA Yellow      Appearance, UA Clear      pH, UA 7.0      Specific Gravity, UA 1.025      Protein, UA Negative      Glucose, UA Negative      Ketones, UA Negative      Bilirubin (UA) Negative      Occult Blood UA Negative      Nitrite, UA Negative      Urobilinogen, UA Negative      Leukocytes, UA Negative      Narrative:     Specimen Source->Urine   HCG, QUANTITATIVE    HCG Quant [id number]      Narrative:     Release to patient->Immediate   TYPE & SCREEN - OB PROFILE    Group & Rh A POS      Indirect Mateus NEG          All Lab Results:  Results for orders placed or performed during the hospital encounter of 03/16/25    Comprehensive metabolic panel    Collection Time: 03/16/25  8:06 AM   Result Value Ref Range    Sodium 135 (L) 136 - 145 mmol/L    Potassium 4.2 3.5 - 5.1 mmol/L    Chloride 109 95 - 110 mmol/L    CO2 21 (L) 23 - 29 mmol/L    Glucose 80 70 - 110 mg/dL    BUN 9 6 - 20 mg/dL    Creatinine 0.7 0.5 - 1.4 mg/dL    Calcium 9.1 8.7 - 10.5 mg/dL    Total Protein 6.4 6.0 - 8.4 g/dL    Albumin 4.0 3.5 - 5.2 g/dL    Total Bilirubin 0.3 0.1 - 1.0 mg/dL    Alkaline Phosphatase 38 (L) 40 - 150 U/L    AST 16 10 - 40 U/L    ALT 15 10 - 44 U/L    eGFR >60 >60 mL/min/1.73 m^2    Anion Gap 5 (L) 8 - 16 mmol/L   CBC auto differential    Collection Time: 03/16/25  8:06 AM   Result Value Ref Range    WBC 5.07 3.90 - 12.70 K/uL    RBC 4.16 4.00 - 5.40 M/uL    Hemoglobin 13.8 12.0 - 16.0 g/dL    Hematocrit 40.1 37.0 - 48.5 %    MCV 96 82 - 98 fL    MCH 33.2 (H) 27.0 - 31.0 pg    MCHC 34.4 32.0 - 36.0 g/dL    RDW 11.3 (L) 11.5 - 14.5 %    Platelets 181 150 - 450 K/uL    MPV 11.3 9.2 - 12.9 fL    Immature Granulocytes 0.2 0.0 - 0.5 %    Gran # (ANC) 3.4 1.8 - 7.7 K/uL    Immature Grans (Abs) 0.01 0.00 - 0.04 K/uL    Lymph # 1.2 1.0 - 4.8 K/uL    Mono # 0.3 0.3 - 1.0 K/uL    Eos # 0.1 0.0 - 0.5 K/uL    Baso # 0.03 0.00 - 0.20 K/uL    nRBC 0 0 /100 WBC    Gran % 67.4 38.0 - 73.0 %    Lymph % 24.1 18.0 - 48.0 %    Mono % 6.7 4.0 - 15.0 %    Eosinophil % 1.0 0.0 - 8.0 %    Basophil % 0.6 0.0 - 1.9 %    Differential Method Automated    hCG, quantitative, pregnancy    Collection Time: 03/16/25  8:06 AM   Result Value Ref Range    HCG Quant [id number] See Text mIU/mL   Type & Screen - Ob Profile    Collection Time: 03/16/25  8:06 AM   Result Value Ref Range    Group & Rh A POS     Indirect Mateus NEG    Urinalysis, Reflex to Urine Culture Urine, Clean Catch    Collection Time: 03/16/25  8:28 AM    Specimen: Urine, Clean Catch   Result Value Ref Range    Specimen UA Urine, Clean Catch     Color, UA Yellow Yellow, Straw, Bernarda    Appearance, UA Clear  Clear    pH, UA 7.0 5.0 - 8.0    Specific Gravity, UA 1.025 1.005 - 1.030    Protein, UA Negative Negative    Glucose, UA Negative Negative    Ketones, UA Negative Negative    Bilirubin (UA) Negative Negative    Occult Blood UA Negative Negative    Nitrite, UA Negative Negative    Urobilinogen, UA Negative <2.0 EU/dL    Leukocytes, UA Negative Negative       Imaging Results:  Imaging Results              US OB <14 Wks TransAbd & TransVag, Single Gestation (XPD) (In process)  Result time 25 09:41:54   Procedure changed from US OB <14 Wks, TransAbd, Single Gestation                         The Emergency Provider reviewed the vital signs and test results, which are outlined above.     ED Discussion     10:13 AM: Reassessed pt at this time. Discussed with patient and family all pertinent ED information and results. Discussed pt dx and plan of tx. Gave the patient and family all f/u and return to the ED instructions. All questions and concerns were addressed at this time. Patient and family express understanding of information and instructions, and is comfortable with plan to discharge. Pt is stable for discharge.     I discussed with patient and family that evaluation in the ED does not suggest any emergent or life threatening medical conditions requiring immediate intervention beyond what was provided in the ED, and I believe patient is safe for discharge. Regardless, an unremarkable evaluation in the ED does not preclude the development or presence of a serious of life threatening condition. As such, I instructed that the patient is to return immediately for any worsening or change in current symptoms.           Medical Decision Making  Differential diagnosis: Vaginal bleeding, threatened miscarriage, completed miscarriage, implantation bleed    The patient is evaluated history physical examination.  She is a proximally 6 weeks gestation  with some rust-colored bleeding today.  She has no pain.  The patient  is Rh positive with a beta-hCG of 24,000 and and confirmed IUP on ultrasound.  I have advised the patient is on pelvic rest in the need for repeat beta-hCG in 48 hours on Tuesday.  She is already established with Dr. Yumiko Gan.  She will attempt follow up on Tuesday and return to the ED if she is unavailable on Tuesday for re-evaluation.  She verbalized agreement with a pelvic rest seems very reliable.  She is stable safe for discharge in my opinion    Amount and/or Complexity of Data Reviewed  Independent Historian: spouse     Details: Pt spouse at bedside.   Labs: ordered. Decision-making details documented in ED Course.     Details: Beta-hCG around 24,000 with Rh positive.  CMP is benign.  Normal white count.  Normal H&H  Radiology: ordered and independent interpretation performed. Decision-making details documented in ED Course.     Details: Confirmed IUP.    Risk  OTC drugs.  Prescription drug management.  Decision regarding hospitalization.                ED Medication(s):  Medications - No data to display    New Prescriptions    No medications on file        Follow-up Information       Yumiko Gan MD.    Specialties: Obstetrics and Gynecology, Obstetrics  Contact information:  [address] [place] [address] DR Jona KIRK [id number] [phone]                                 Scribe Attestation:   Scribe #1: I performed the above scribed service and the documentation accurately describes the services I performed. I attest to the accuracy of the note.     Attending:   Physician Attestation Statement for Scribe #1: I, Giuseppe Mart Jr., MD, personally performed the services described in this documentation, as scribed by Angie Hair, in my presence, and it is both accurate and complete.           Clinical Impression       ICD-10-CM ICD-9-CM   1. Threatened miscarriage in early pregnancy  O20.0 640.03   2. Vaginal bleeding  N93.9 623.8   3. Intrauterine pregnancy  Z34.90 V22.2       Disposition:    Disposition: Discharged  Condition: Stable         Giuseppe Mart Jr., MD  03/16/25 [id number]

## 2025-03-16 NOTE — DISCHARGE INSTRUCTIONS
Pelvic rest until cleared by your OB.  Have your beta-hCG rechecked on Tuesday morning.  If your OB is not available return to the ED to have this done.  The timing of this is most important.  It must be done Tuesday and should double by that time.  Your level today is 24,718.  Your blood type is A positive return as needed

## 2025-03-17 ENCOUNTER — TELEPHONE (OUTPATIENT)
Dept: OBSTETRICS AND GYNECOLOGY | Facility: CLINIC | Age: 30
End: 2025-03-17
Payer: COMMERCIAL

## 2025-03-17 NOTE — TELEPHONE ENCOUNTER
----- Message from Ever sent at 3/17/2025  7:49 AM CDT -----  Type:  Needs Medical AdviceWho Called: PatientSymptoms (please be specific): na How long has patient had these symptoms:  naPharmacy name and phone #:  naWould the patient rather a call back or a response via [facility]? Call [facility] Call Back Number: [phone] Information: Pt is requesting lab orders for HCG per the ED provider

## 2025-03-18 NOTE — TELEPHONE ENCOUNTER
Yumiko Gan MD to Me (Selected Message)      3/17/25  3:54 PM  I think Aster already responded to this, but just make sure.  Since the ultrasound showed a viable pregnancy with a visible heart beat, there is no indication to repeat the beta hcg.  Needs to keep appointment for ultrasound and visit on 4/2/25 as scheduled.  Advise to monitor her bleeding.  If bleeding becomes heavy like a period, or has associated severe cramps (mild cramps are okay), then we'll need to get her in sooner.    Patient's lab appt. Today has been cancelled.

## 2025-04-02 ENCOUNTER — INITIAL PRENATAL (OUTPATIENT)
Dept: OBSTETRICS AND GYNECOLOGY | Facility: CLINIC | Age: 30
End: 2025-04-02
Payer: COMMERCIAL

## 2025-04-02 ENCOUNTER — PATIENT MESSAGE (OUTPATIENT)
Dept: OBSTETRICS AND GYNECOLOGY | Facility: CLINIC | Age: 30
End: 2025-04-02

## 2025-04-02 ENCOUNTER — PROCEDURE VISIT (OUTPATIENT)
Dept: OBSTETRICS AND GYNECOLOGY | Facility: CLINIC | Age: 30
End: 2025-04-02
Payer: COMMERCIAL

## 2025-04-02 VITALS
SYSTOLIC BLOOD PRESSURE: 102 MMHG | BODY MASS INDEX: 25.53 KG/M2 | WEIGHT: 188.25 LBS | DIASTOLIC BLOOD PRESSURE: 70 MMHG

## 2025-04-02 DIAGNOSIS — F41.1 GENERALIZED ANXIETY DISORDER: ICD-10-CM

## 2025-04-02 DIAGNOSIS — Z34.01 NORMAL FIRST PREGNANCY CONFIRMED, CURRENTLY IN FIRST TRIMESTER: Primary | ICD-10-CM

## 2025-04-02 DIAGNOSIS — Z32.01 PREGNANCY EXAMINATION OR TEST, POSITIVE RESULT: ICD-10-CM

## 2025-04-02 DIAGNOSIS — F90.9 ATTENTION DEFICIT HYPERACTIVITY DISORDER (ADHD), UNSPECIFIED ADHD TYPE: ICD-10-CM

## 2025-04-02 PROBLEM — Z34.00 NORMAL FIRST PREGNANCY CONFIRMED: Status: ACTIVE | Noted: 2025-03-05

## 2025-04-02 PROCEDURE — 99999 PR PBB SHADOW E&M-EST. PATIENT-LVL II: CPT | Mod: PBBFAC,,,

## 2025-04-02 PROCEDURE — 0500F INITIAL PRENATAL CARE VISIT: CPT | Mod: S$GLB,,,

## 2025-04-02 PROCEDURE — 76801 OB US < 14 WKS SINGLE FETUS: CPT | Mod: S$GLB,,, | Performed by: OBSTETRICS & GYNECOLOGY

## 2025-04-02 NOTE — PROGRESS NOTES
29 y.o. female  at 8w5d here for initial OB visit  denies VB or cramping  Doing well. Has stopped Vyvance cold turkey and since having drops in BP. Pt reports one 6oz cup of coffee per day. Discussed electrolytes and caffeine intake to prevent drops in BP  /70   Wt 85.4 kg (188 lb 4.4 oz)    BMI 25.53 kg/m²    Reviewed prenatal labs   Genetic testing information reviewed  A-Z book given and discussed  Delivery consents signed    Dating US: single viable IUP, embryo grossly WNL with normal cardiac activity. Uterus, cervix and adnexae appear normal. No fluid seen in cul-de-sac. EGA 8w5d with CHRISTAL 25       1. Normal first pregnancy confirmed, currently in first trimester    2. Attention deficit hyperactivity disorder (ADHD), unspecified ADHD type  Overview:  Vyvance 60mg. Reached out to PCP for Rx if pt is unable to wean 10mg increments due to risk of hazard in attention deficit ok to continue per Dr. AMADA Gan    25 PT cold turkey stopped.       3. Generalized anxiety disorder  Overview:  Stable on prozac           Reviewed warning signs, pregnancy precautions and how/when to call.  RTC x 4 wks, call or present sooner prn.

## 2025-04-30 ENCOUNTER — ROUTINE PRENATAL (OUTPATIENT)
Dept: OBSTETRICS AND GYNECOLOGY | Facility: CLINIC | Age: 30
End: 2025-04-30
Payer: COMMERCIAL

## 2025-04-30 VITALS
WEIGHT: 195.56 LBS | SYSTOLIC BLOOD PRESSURE: 112 MMHG | BODY MASS INDEX: 26.52 KG/M2 | DIASTOLIC BLOOD PRESSURE: 70 MMHG

## 2025-04-30 DIAGNOSIS — Z13.79 GENETIC SCREENING: ICD-10-CM

## 2025-04-30 DIAGNOSIS — Z3A.12 12 WEEKS GESTATION OF PREGNANCY: Primary | ICD-10-CM

## 2025-04-30 DIAGNOSIS — Z36.89 ENCOUNTER FOR FETAL ANATOMIC SURVEY: ICD-10-CM

## 2025-04-30 DIAGNOSIS — Z34.02 NORMAL FIRST PREGNANCY CONFIRMED, CURRENTLY IN SECOND TRIMESTER: ICD-10-CM

## 2025-04-30 PROCEDURE — 99999 PR PBB SHADOW E&M-EST. PATIENT-LVL II: CPT | Mod: PBBFAC,,,

## 2025-04-30 PROCEDURE — 0502F SUBSEQUENT PRENATAL CARE: CPT | Mod: S$GLB,,,

## 2025-04-30 NOTE — PROGRESS NOTES
29 y.o. female  at 12w5d   denies VB or cramping  Doing well without concerns   First trimester s/s improving  /70   Wt 88.7 kg (195 lb 8.8 oz)   BMI 26.52 kg/m²    TW lbs   Reviewed prenatal labs   Genetic testing doing today    Enrolled in connected moms    1. 12 weeks gestation of pregnancy  Connected MOM Enrollment  -     Assign Connected MOM Program Consent Questionnaire    2. Encounter for fetal anatomic survey  -     US OB/GYN Procedure ([facility])-Future; Future; Expected date: 2025    3. Genetic screening  --     [id number] Plus W/ Sex Chromosome Abnormalities* T21, T18, T13 & Y + X; Future; Expected date: 2025         4. Normal first pregnancy confirmed, currently in second trimester         Reviewed warning signs, pregnancy precautions and how/when to call.  RTC x 4 wks, call or present sooner prn.

## 2025-05-06 ENCOUNTER — TELEPHONE (OUTPATIENT)
Dept: OBSTETRICS AND GYNECOLOGY | Facility: CLINIC | Age: 30
End: 2025-05-06
Payer: COMMERCIAL

## 2025-05-06 ENCOUNTER — RESULTS FOLLOW-UP (OUTPATIENT)
Dept: OBSTETRICS AND GYNECOLOGY | Facility: CLINIC | Age: 30
End: 2025-05-06

## 2025-05-06 NOTE — TELEPHONE ENCOUNTER
Called pt and let her know mat 21 is normal, and gender of baby(boy). Pt excited.  Verified 2 pt identifiers. Pt verbalized understanding.

## 2025-05-22 ENCOUNTER — ROUTINE PRENATAL (OUTPATIENT)
Dept: OBSTETRICS AND GYNECOLOGY | Facility: CLINIC | Age: 30
End: 2025-05-22
Payer: COMMERCIAL

## 2025-05-22 VITALS
SYSTOLIC BLOOD PRESSURE: 112 MMHG | BODY MASS INDEX: 27.27 KG/M2 | DIASTOLIC BLOOD PRESSURE: 68 MMHG | WEIGHT: 201.06 LBS

## 2025-05-22 DIAGNOSIS — F41.1 GENERALIZED ANXIETY DISORDER: ICD-10-CM

## 2025-05-22 DIAGNOSIS — Z34.02 NORMAL FIRST PREGNANCY CONFIRMED, CURRENTLY IN SECOND TRIMESTER: Primary | ICD-10-CM

## 2025-05-22 PROCEDURE — 0502F SUBSEQUENT PRENATAL CARE: CPT | Mod: S$GLB,,,

## 2025-05-22 PROCEDURE — 99999 PR PBB SHADOW E&M-EST. PATIENT-LVL II: CPT | Mod: PBBFAC,,,

## 2025-05-22 NOTE — PROGRESS NOTES
29 y.o. female  at 15w6d   Is not feeling flutters, denies VB, LOF or cramping  Doing well without concerns   /68   Wt 91.2 kg (201 lb 1 oz)   BMI 27.27 kg/m²    TW lbs   Genetic testing WNL  Anatomy scan ordered    1. Normal first pregnancy confirmed, currently in second trimester    2. Generalized anxiety disorder  Overview:  Stable on prozac           Reviewed warning signs, pregnancy precautions and how/when to call.  RTC x 4 wks, call or present sooner prn.

## 2025-05-23 ENCOUNTER — PATIENT MESSAGE (OUTPATIENT)
Dept: OTHER | Facility: OTHER | Age: 30
End: 2025-05-23
Payer: COMMERCIAL

## 2025-05-30 ENCOUNTER — PATIENT MESSAGE (OUTPATIENT)
Dept: OTHER | Facility: OTHER | Age: 30
End: 2025-05-30
Payer: COMMERCIAL

## 2025-06-02 ENCOUNTER — PATIENT MESSAGE (OUTPATIENT)
Dept: OBSTETRICS AND GYNECOLOGY | Facility: CLINIC | Age: 30
End: 2025-06-02
Payer: COMMERCIAL

## 2025-06-03 ENCOUNTER — PATIENT MESSAGE (OUTPATIENT)
Dept: OBSTETRICS AND GYNECOLOGY | Facility: CLINIC | Age: 30
End: 2025-06-03
Payer: COMMERCIAL

## 2025-06-20 ENCOUNTER — OFFICE VISIT (OUTPATIENT)
Dept: URGENT CARE | Facility: CLINIC | Age: 30
End: 2025-06-20
Payer: COMMERCIAL

## 2025-06-20 ENCOUNTER — PATIENT MESSAGE (OUTPATIENT)
Dept: OTHER | Facility: OTHER | Age: 30
End: 2025-06-20
Payer: COMMERCIAL

## 2025-06-20 VITALS
DIASTOLIC BLOOD PRESSURE: 69 MMHG | WEIGHT: 201.06 LBS | SYSTOLIC BLOOD PRESSURE: 112 MMHG | HEART RATE: 75 BPM | TEMPERATURE: 99 F | HEIGHT: 72 IN | OXYGEN SATURATION: 98 % | BODY MASS INDEX: 27.23 KG/M2 | RESPIRATION RATE: 16 BRPM

## 2025-06-20 DIAGNOSIS — R39.15 URGENCY OF URINATION: ICD-10-CM

## 2025-06-20 DIAGNOSIS — Z3A.20 20 WEEKS GESTATION OF PREGNANCY: ICD-10-CM

## 2025-06-20 DIAGNOSIS — R35.0 FREQUENCY OF URINATION: ICD-10-CM

## 2025-06-20 DIAGNOSIS — N30.00 ACUTE CYSTITIS WITHOUT HEMATURIA: Primary | ICD-10-CM

## 2025-06-20 LAB
BILIRUBIN, UA POC OHS: NEGATIVE
BLOOD, UA POC OHS: NEGATIVE
CLARITY, UA POC OHS: CLEAR
COLOR, UA POC OHS: NORMAL
GLUCOSE, UA POC OHS: NEGATIVE
KETONES, UA POC OHS: NEGATIVE
LEUKOCYTES, UA POC OHS: NEGATIVE
NITRITE, UA POC OHS: NEGATIVE
PH, UA POC OHS: 5.5
PROTEIN, UA POC OHS: NEGATIVE
SPECIFIC GRAVITY, UA POC OHS: 1.01
UROBILINOGEN, UA POC OHS: 0.2

## 2025-06-20 PROCEDURE — 87086 URINE CULTURE/COLONY COUNT: CPT

## 2025-06-20 RX ORDER — AMOXICILLIN AND CLAVULANATE POTASSIUM 875; 125 MG/1; MG/1
1 TABLET, FILM COATED ORAL EVERY 12 HOURS
Qty: 10 TABLET | Refills: 0 | Status: SHIPPED | OUTPATIENT
Start: 2025-06-20 | End: 2025-06-25

## 2025-06-20 NOTE — PROGRESS NOTES
Subjective:      Patient ID: Rebeca Ortega is a 29 y.o. female.    Vitals:  height is 6' (1.829 m) and weight is 91.2 kg (201 lb 1 oz). Her oral temperature is 98.7 °F (37.1 °C). Her blood pressure is 112/69 and her pulse is 75. Her respiration is 16 and oxygen saturation is 98%.     Chief Complaint: Dysuria    29-year-old female presents to the clinic today with chief complaint of decreased urine, urgency, frequency. Symptoms started one day ago and have not improved.  Patient has not taken any tylenol.  Denies any urinary hematuria, dysuria, vaginal discharge, vaginal odor, vaginal pain, pelvic pain, flank pain, back pain or abdominal pain.  Patient is currently pregnant. Denies hx of recurrent kidney stones, but she does have recurrent UTI's.  Denies any pain or radiation of pain. Denies fever, chills, body aches, chest pain, shortness of breath, wheezing, abdominal pain, nausea, vomiting, diarrhea, or rashes.      Dysuria   This is a new problem. The current episode started today. The problem has been gradually worsening. The pain is at a severity of 0/10. The patient is experiencing no pain. There has been no fever. She is Sexually active. Associated symptoms include frequency and urgency. Pertinent negatives include no chills, flank pain, hematuria, nausea, vomiting or rash. She has tried nothing for the symptoms.       Constitution: Negative for appetite change, chills, sweating, fatigue and fever.   Cardiovascular:  Negative for chest pain.   Gastrointestinal:  Negative for abdominal pain, nausea, vomiting and diarrhea.   Genitourinary:  Positive for frequency and urgency. Negative for dysuria, urine decreased, flank pain, bladder incontinence, bed wetting, hematuria, history of kidney stones, vaginal pain, vaginal discharge, vaginal bleeding, vaginal odor, genital sore and pelvic pain.   Skin:  Negative for rash.      Objective:     Physical Exam   Constitutional: She is oriented to person, place, and  time. She appears well-developed.  Non-toxic appearance. She does not appear ill. No distress.   HENT:   Head: Normocephalic and atraumatic.   Ears:   Right Ear: External ear normal.   Left Ear: External ear normal.   Nose: Nose normal.   Mouth/Throat: Mucous membranes are normal.   Eyes: Conjunctivae and lids are normal.   Neck: Trachea normal. Neck supple.   Cardiovascular: Normal rate, regular rhythm and normal heart sounds.   Pulmonary/Chest: Effort normal and breath sounds normal. No stridor. No respiratory distress. She has no decreased breath sounds. She has no wheezes. She has no rhonchi. She has no rales.   Abdominal: Normal appearance. She exhibits no distension and no mass. Soft. Bowel sounds are decreased. There is no rebound, no guarding, no left CVA tenderness and no right CVA tenderness.   Musculoskeletal: Normal range of motion.         General: Normal range of motion.   Neurological: She is alert and oriented to person, place, and time. She has normal strength.   Skin: Skin is warm, dry, intact, not diaphoretic and not pale.   Psychiatric: Her speech is normal and behavior is normal. Judgment and thought content normal.   Nursing note and vitals reviewed.      Assessment:     1. Acute cystitis without hematuria    2. Urgency of urination    3. Frequency of urination    4. 20 weeks gestation of pregnancy        Results for orders placed or performed in visit on 06/20/25   POCT Urinalysis(Instrument)    Collection Time: 06/20/25 10:02 AM   Result Value Ref Range    Color, POC UA Straw Yellow, Straw, Colorless    Clarity, POC UA Clear Clear    Glucose, POC UA Negative Negative    Bilirubin, POC UA Negative Negative    Ketones, POC UA Negative Negative    Spec Grav POC UA 1.010 1.005 - 1.030    Blood, POC UA Negative Negative    pH, POC UA 5.5 5.0 - 8.0    Protein, POC UA Negative Negative    Urobilinogen, POC UA 0.2 <=1.0    Nitrite, POC UA Negative Negative    WBC, POC UA Negative Negative          Plan:       Acute cystitis without hematuria  -     Urine Culture High Risk ($$)  -     amoxicillin-clavulanate 875-125mg (AUGMENTIN) 875-125 mg per tablet; Take 1 tablet by mouth every 12 (twelve) hours. for 5 days  Dispense: 10 tablet; Refill: 0    Urgency of urination  -     POCT Urinalysis(Instrument)    Frequency of urination  -     POCT Urinalysis(Instrument)    20 weeks gestation of pregnancy  -     Urine Culture High Risk ($$)

## 2025-06-20 NOTE — LETTER
June 20, 2025      [facility]  [address], [address]  [facility] [address]  Phone: [phone]  Fax: [phone]       Patient: Rebeca Ortega   YOB: 1995  Date of Visit: 06/20/2025    To Whom It May Concern:    Yang Ortega  was at [facility] on 06/20/2025. The patient may return to work/school on 6/23/25 with no restrictions. If you have any questions or concerns, or if I can be of further assistance, please do not hesitate to contact me.    Sincerely,    Cuca Flores PA-C

## 2025-06-23 ENCOUNTER — RESULTS FOLLOW-UP (OUTPATIENT)
Dept: URGENT CARE | Facility: CLINIC | Age: 30
End: 2025-06-23

## 2025-06-25 ENCOUNTER — PROCEDURE VISIT (OUTPATIENT)
Dept: OBSTETRICS AND GYNECOLOGY | Facility: CLINIC | Age: 30
End: 2025-06-25
Payer: COMMERCIAL

## 2025-06-25 ENCOUNTER — ROUTINE PRENATAL (OUTPATIENT)
Dept: OBSTETRICS AND GYNECOLOGY | Facility: CLINIC | Age: 30
End: 2025-06-25
Payer: COMMERCIAL

## 2025-06-25 VITALS
DIASTOLIC BLOOD PRESSURE: 70 MMHG | SYSTOLIC BLOOD PRESSURE: 106 MMHG | BODY MASS INDEX: 28.02 KG/M2 | WEIGHT: 206.56 LBS

## 2025-06-25 DIAGNOSIS — Z36.89 ENCOUNTER FOR FETAL ANATOMIC SURVEY: ICD-10-CM

## 2025-06-25 DIAGNOSIS — Z34.02 NORMAL FIRST PREGNANCY CONFIRMED, CURRENTLY IN SECOND TRIMESTER: Primary | ICD-10-CM

## 2025-06-25 DIAGNOSIS — F41.1 GENERALIZED ANXIETY DISORDER: ICD-10-CM

## 2025-06-25 PROCEDURE — 99999 PR PBB SHADOW E&M-EST. PATIENT-LVL II: CPT | Mod: PBBFAC,,,

## 2025-06-25 PROCEDURE — 76805 OB US >/= 14 WKS SNGL FETUS: CPT | Mod: S$GLB,,, | Performed by: OBSTETRICS & GYNECOLOGY

## 2025-06-25 PROCEDURE — 0502F SUBSEQUENT PRENATAL CARE: CPT | Mod: S$GLB,,,

## 2025-06-25 NOTE — PROGRESS NOTES
29 y.o. female  at 20w5d   is feeling flutters/FM, denies VB, LOF or cramping  Doing well without concerns   TW lbs     Reviewed anatomy US-normal fetal anatomy with no obvious abnormalities. S=D. Normal amniotic fluid, normal placental location in posterior position, no evidence of previa. Normal appearing cervical length at 37.8mm. male gender. 3VC. EFW 37%tile. Appears complete Will await [facility] recommendations.      1. Normal first pregnancy confirmed, currently in second trimester    2. Generalized anxiety disorder  Overview:  Stable on prozac           Reviewed warning signs, normal FM,  labor precautions and how/when to call.  RTC x 4 wks, call or present sooner prn.

## 2025-07-01 ENCOUNTER — PATIENT MESSAGE (OUTPATIENT)
Dept: OBSTETRICS AND GYNECOLOGY | Facility: CLINIC | Age: 30
End: 2025-07-01
Payer: COMMERCIAL

## 2025-07-18 ENCOUNTER — PATIENT MESSAGE (OUTPATIENT)
Dept: OTHER | Facility: OTHER | Age: 30
End: 2025-07-18
Payer: COMMERCIAL

## 2025-07-23 ENCOUNTER — ROUTINE PRENATAL (OUTPATIENT)
Dept: OBSTETRICS AND GYNECOLOGY | Facility: CLINIC | Age: 30
End: 2025-07-23
Payer: COMMERCIAL

## 2025-07-23 VITALS
BODY MASS INDEX: 29.24 KG/M2 | SYSTOLIC BLOOD PRESSURE: 108 MMHG | DIASTOLIC BLOOD PRESSURE: 68 MMHG | WEIGHT: 215.63 LBS

## 2025-07-23 DIAGNOSIS — Z3A.28 28 WEEKS GESTATION OF PREGNANCY: Primary | ICD-10-CM

## 2025-07-23 DIAGNOSIS — F41.1 GENERALIZED ANXIETY DISORDER: ICD-10-CM

## 2025-07-23 DIAGNOSIS — Z34.02 NORMAL FIRST PREGNANCY CONFIRMED, CURRENTLY IN SECOND TRIMESTER: ICD-10-CM

## 2025-07-23 PROCEDURE — 0502F SUBSEQUENT PRENATAL CARE: CPT | Mod: S$GLB,,,

## 2025-07-23 PROCEDURE — 99999 PR PBB SHADOW E&M-EST. PATIENT-LVL II: CPT | Mod: PBBFAC,,,

## 2025-07-23 NOTE — PROGRESS NOTES
29 y.o. female  at 24w5d   Reports + FM, denies VB, LOF, or cramping  Doing well. 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 Letter provided for accommodations due to risk of injury  /68   Wt 97.8 kg (215 lb 9.8 oz)   BMI 29.24 kg/m²    TW lbs     Reviewed upcoming 28wk labs, (A POS) and orders placed  Tdap handout provided and explained    1. 28 weeks gestation of pregnancy  -     OB Glucose Screen; Future; Expected date: 2025  -     CBC auto differential; Future; Expected date: 2025  -     HIV 1/2 Ag/Ab (4th Gen); Future; Expected date: 2025  -     Treponema Pallidium Antibodies IgG, IgM; Future; Expected date: 2025    2. Normal first pregnancy confirmed, currently in second trimester    3. Generalized anxiety disorder  Overview:  Stable on prozac           Reviewed warning signs, normal FM,  labor precautions and how/when to call.  RTC x 4 wks, call or present sooner prn.

## 2025-07-24 ENCOUNTER — PATIENT MESSAGE (OUTPATIENT)
Dept: OBSTETRICS AND GYNECOLOGY | Facility: CLINIC | Age: 30
End: 2025-07-24
Payer: COMMERCIAL

## 2025-08-01 ENCOUNTER — PATIENT MESSAGE (OUTPATIENT)
Dept: OTHER | Facility: OTHER | Age: 30
End: 2025-08-01
Payer: COMMERCIAL

## 2025-08-10 ENCOUNTER — PATIENT MESSAGE (OUTPATIENT)
Dept: OBSTETRICS AND GYNECOLOGY | Facility: CLINIC | Age: 30
End: 2025-08-10
Payer: COMMERCIAL

## 2025-08-11 ENCOUNTER — ROUTINE PRENATAL (OUTPATIENT)
Dept: OBSTETRICS AND GYNECOLOGY | Facility: CLINIC | Age: 30
End: 2025-08-11
Payer: COMMERCIAL

## 2025-08-11 ENCOUNTER — LAB VISIT (OUTPATIENT)
Dept: LAB | Facility: HOSPITAL | Age: 30
End: 2025-08-11
Payer: COMMERCIAL

## 2025-08-11 VITALS
BODY MASS INDEX: 29.27 KG/M2 | WEIGHT: 215.81 LBS | DIASTOLIC BLOOD PRESSURE: 82 MMHG | SYSTOLIC BLOOD PRESSURE: 125 MMHG

## 2025-08-11 DIAGNOSIS — Z3A.28 28 WEEKS GESTATION OF PREGNANCY: ICD-10-CM

## 2025-08-11 DIAGNOSIS — Z3A.27 27 WEEKS GESTATION OF PREGNANCY: Primary | ICD-10-CM

## 2025-08-11 DIAGNOSIS — B37.31 YEAST INFECTION OF THE VAGINA: ICD-10-CM

## 2025-08-11 DIAGNOSIS — F41.1 GENERALIZED ANXIETY DISORDER: ICD-10-CM

## 2025-08-11 LAB
ABSOLUTE EOSINOPHIL (OHS): 0.1 K/UL
ABSOLUTE MONOCYTE (OHS): 0.59 K/UL (ref 0.3–1)
ABSOLUTE NEUTROPHIL COUNT (OHS): 6.93 K/UL (ref 1.8–7.7)
BASOPHILS # BLD AUTO: 0.03 K/UL
BASOPHILS NFR BLD AUTO: 0.3 %
ERYTHROCYTE [DISTWIDTH] IN BLOOD BY AUTOMATED COUNT: 12.4 % (ref 11.5–14.5)
GLUCOSE SERPL-MCNC: 73 MG/DL (ref 70–140)
HCT VFR BLD AUTO: 38.6 % (ref 37–48.5)
HGB BLD-MCNC: 13.2 GM/DL (ref 12–16)
HIV 1+2 AB+HIV1 P24 AG SERPL QL IA: NORMAL
IMM GRANULOCYTES # BLD AUTO: 0.04 K/UL (ref 0–0.04)
IMM GRANULOCYTES NFR BLD AUTO: 0.4 % (ref 0–0.5)
LYMPHOCYTES # BLD AUTO: 1.38 K/UL (ref 1–4.8)
MCH RBC QN AUTO: 34 PG (ref 27–31)
MCHC RBC AUTO-ENTMCNC: 34.2 G/DL (ref 32–36)
MCV RBC AUTO: 100 FL (ref 82–98)
NUCLEATED RBC (/100WBC) (OHS): 0 /100 WBC
PLATELET # BLD AUTO: 192 K/UL (ref 150–450)
PMV BLD AUTO: 12.1 FL (ref 9.2–12.9)
RBC # BLD AUTO: 3.88 M/UL (ref 4–5.4)
RELATIVE EOSINOPHIL (OHS): 1.1 %
RELATIVE LYMPHOCYTE (OHS): 15.2 % (ref 18–48)
RELATIVE MONOCYTE (OHS): 6.5 % (ref 4–15)
RELATIVE NEUTROPHIL (OHS): 76.5 % (ref 38–73)
T PALLIDUM IGG+IGM SER QL: NORMAL
WBC # BLD AUTO: 9.07 K/UL (ref 3.9–12.7)

## 2025-08-11 PROCEDURE — 85025 COMPLETE CBC W/AUTO DIFF WBC: CPT

## 2025-08-11 PROCEDURE — 0502F SUBSEQUENT PRENATAL CARE: CPT | Mod: S$GLB,,,

## 2025-08-11 PROCEDURE — 99999 PR PBB SHADOW E&M-EST. PATIENT-LVL II: CPT | Mod: PBBFAC,,,

## 2025-08-11 PROCEDURE — 86593 SYPHILIS TEST NON-TREP QUANT: CPT

## 2025-08-11 PROCEDURE — 36415 COLL VENOUS BLD VENIPUNCTURE: CPT

## 2025-08-11 PROCEDURE — 87389 HIV-1 AG W/HIV-1&-2 AB AG IA: CPT

## 2025-08-11 PROCEDURE — 82950 GLUCOSE TEST: CPT

## 2025-08-11 RX ORDER — FLUCONAZOLE 100 MG/1
100 TABLET ORAL ONCE
Qty: 1 TABLET | Refills: 0 | Status: SHIPPED | OUTPATIENT
Start: 2025-08-11 | End: 2025-08-11

## 2025-08-13 ENCOUNTER — PATIENT MESSAGE (OUTPATIENT)
Dept: OBSTETRICS AND GYNECOLOGY | Facility: CLINIC | Age: 30
End: 2025-08-13
Payer: COMMERCIAL

## 2025-08-15 ENCOUNTER — ROUTINE PRENATAL (OUTPATIENT)
Dept: OBSTETRICS AND GYNECOLOGY | Facility: CLINIC | Age: 30
End: 2025-08-15
Payer: COMMERCIAL

## 2025-08-15 ENCOUNTER — PATIENT MESSAGE (OUTPATIENT)
Dept: OTHER | Facility: OTHER | Age: 30
End: 2025-08-15
Payer: COMMERCIAL

## 2025-08-15 VITALS — DIASTOLIC BLOOD PRESSURE: 74 MMHG | SYSTOLIC BLOOD PRESSURE: 108 MMHG | WEIGHT: 218.5 LBS | BODY MASS INDEX: 29.63 KG/M2

## 2025-08-15 DIAGNOSIS — N93.9 VAGINAL BLEEDING: Primary | ICD-10-CM

## 2025-08-15 DIAGNOSIS — N89.8 VAGINAL DISCHARGE: ICD-10-CM

## 2025-08-15 LAB
GARDNERELLA VAGINALIS: NEGATIVE
OTHER MICROSC. OBSERVATIONS: ABNORMAL
POC BACTERIAL VAGINOSIS: NEGATIVE
POC CLUE CELLS: NEGATIVE
TRICHOMONAS, POC: NEGATIVE
YEAST WET PREP: POSITIVE

## 2025-08-15 PROCEDURE — 99999 PR PBB SHADOW E&M-EST. PATIENT-LVL II: CPT | Mod: PBBFAC,,, | Performed by: STUDENT IN AN ORGANIZED HEALTH CARE EDUCATION/TRAINING PROGRAM

## 2025-08-15 RX ORDER — FLUCONAZOLE 150 MG/1
150 TABLET ORAL
Qty: 2 TABLET | Refills: 0 | Status: SHIPPED | OUTPATIENT
Start: 2025-08-15

## 2025-08-25 ENCOUNTER — ROUTINE PRENATAL (OUTPATIENT)
Dept: OBSTETRICS AND GYNECOLOGY | Facility: CLINIC | Age: 30
End: 2025-08-25
Payer: COMMERCIAL

## 2025-08-25 VITALS
WEIGHT: 219.56 LBS | BODY MASS INDEX: 29.78 KG/M2 | SYSTOLIC BLOOD PRESSURE: 112 MMHG | DIASTOLIC BLOOD PRESSURE: 68 MMHG

## 2025-08-25 DIAGNOSIS — Z3A.29 29 WEEKS GESTATION OF PREGNANCY: Primary | ICD-10-CM

## 2025-08-25 DIAGNOSIS — F90.9 ATTENTION DEFICIT HYPERACTIVITY DISORDER (ADHD), UNSPECIFIED ADHD TYPE: ICD-10-CM

## 2025-08-25 DIAGNOSIS — M62.89 PELVIC FLOOR DYSFUNCTION IN FEMALE: ICD-10-CM

## 2025-08-25 DIAGNOSIS — F41.1 GENERALIZED ANXIETY DISORDER: ICD-10-CM

## 2025-08-25 PROBLEM — Z34.03 NORMAL FIRST PREGNANCY CONFIRMED, CURRENTLY IN THIRD TRIMESTER: Status: ACTIVE | Noted: 2025-03-05

## 2025-08-25 PROBLEM — K62.9 ANAL LESION: Status: RESOLVED | Noted: 2023-06-12 | Resolved: 2025-08-25

## 2025-08-25 PROBLEM — N92.0 MENORRHAGIA: Status: RESOLVED | Noted: 2023-10-31 | Resolved: 2025-08-25

## 2025-08-25 PROCEDURE — 0502F SUBSEQUENT PRENATAL CARE: CPT | Mod: S$GLB,,, | Performed by: MIDWIFE

## 2025-08-25 PROCEDURE — 99999 PR PBB SHADOW E&M-EST. PATIENT-LVL III: CPT | Mod: PBBFAC,,, | Performed by: MIDWIFE

## 2025-08-25 PROCEDURE — 90715 TDAP VACCINE 7 YRS/> IM: CPT | Mod: S$GLB,,, | Performed by: STUDENT IN AN ORGANIZED HEALTH CARE EDUCATION/TRAINING PROGRAM

## 2025-08-25 PROCEDURE — 90471 IMMUNIZATION ADMIN: CPT | Mod: S$GLB,,, | Performed by: STUDENT IN AN ORGANIZED HEALTH CARE EDUCATION/TRAINING PROGRAM

## 2025-08-26 ENCOUNTER — PATIENT MESSAGE (OUTPATIENT)
Dept: OBSTETRICS AND GYNECOLOGY | Facility: CLINIC | Age: 30
End: 2025-08-26
Payer: COMMERCIAL

## 2025-08-27 ENCOUNTER — ROUTINE PRENATAL (OUTPATIENT)
Dept: OBSTETRICS AND GYNECOLOGY | Facility: CLINIC | Age: 30
End: 2025-08-27
Payer: COMMERCIAL

## 2025-08-27 ENCOUNTER — PATIENT MESSAGE (OUTPATIENT)
Dept: OBSTETRICS AND GYNECOLOGY | Facility: CLINIC | Age: 30
End: 2025-08-27

## 2025-08-27 VITALS
SYSTOLIC BLOOD PRESSURE: 102 MMHG | DIASTOLIC BLOOD PRESSURE: 72 MMHG | BODY MASS INDEX: 29.75 KG/M2 | WEIGHT: 219.38 LBS

## 2025-08-27 DIAGNOSIS — N89.8 VAGINAL DISCHARGE DURING PREGNANCY IN THIRD TRIMESTER: Primary | ICD-10-CM

## 2025-08-27 DIAGNOSIS — Z3A.29 29 WEEKS GESTATION OF PREGNANCY: ICD-10-CM

## 2025-08-27 DIAGNOSIS — O26.893 VAGINAL DISCHARGE DURING PREGNANCY IN THIRD TRIMESTER: Primary | ICD-10-CM

## 2025-08-27 PROCEDURE — 99999 PR PBB SHADOW E&M-EST. PATIENT-LVL II: CPT | Mod: PBBFAC,,, | Performed by: MIDWIFE

## 2025-08-27 PROCEDURE — 0502F SUBSEQUENT PRENATAL CARE: CPT | Mod: S$GLB,,, | Performed by: MIDWIFE

## 2025-08-29 ENCOUNTER — PATIENT MESSAGE (OUTPATIENT)
Dept: OTHER | Facility: OTHER | Age: 30
End: 2025-08-29
Payer: COMMERCIAL

## 2025-09-05 ENCOUNTER — ROUTINE PRENATAL (OUTPATIENT)
Dept: OBSTETRICS AND GYNECOLOGY | Facility: CLINIC | Age: 30
End: 2025-09-05
Payer: COMMERCIAL

## 2025-09-05 VITALS
BODY MASS INDEX: 31.93 KG/M2 | DIASTOLIC BLOOD PRESSURE: 70 MMHG | WEIGHT: 235.44 LBS | SYSTOLIC BLOOD PRESSURE: 110 MMHG

## 2025-09-05 DIAGNOSIS — Z3A.31 31 WEEKS GESTATION OF PREGNANCY: Primary | ICD-10-CM

## 2025-09-05 DIAGNOSIS — F41.1 GENERALIZED ANXIETY DISORDER: ICD-10-CM

## 2025-09-05 DIAGNOSIS — F90.9 ATTENTION DEFICIT HYPERACTIVITY DISORDER (ADHD), UNSPECIFIED ADHD TYPE: ICD-10-CM

## 2025-09-05 PROCEDURE — 99999 PR PBB SHADOW E&M-EST. PATIENT-LVL II: CPT | Mod: PBBFAC,,,
